# Patient Record
Sex: MALE | Race: WHITE | NOT HISPANIC OR LATINO | Employment: FULL TIME | URBAN - METROPOLITAN AREA
[De-identification: names, ages, dates, MRNs, and addresses within clinical notes are randomized per-mention and may not be internally consistent; named-entity substitution may affect disease eponyms.]

---

## 2017-03-07 ENCOUNTER — TELEPHONE (OUTPATIENT)
Dept: CARDIOLOGY | Facility: CLINIC | Age: 42
End: 2017-03-07

## 2017-03-07 DIAGNOSIS — B20 HIV (HUMAN IMMUNODEFICIENCY VIRUS INFECTION): Primary | ICD-10-CM

## 2017-03-07 DIAGNOSIS — R07.89 OTHER CHEST PAIN: ICD-10-CM

## 2017-03-07 NOTE — TELEPHONE ENCOUNTER
Received fax order to schedule patient for TM stress test.  Awaiting office note  Please have Dr. Burns review and schedule follow up appointment if needed

## 2017-03-16 ENCOUNTER — CLINICAL SUPPORT (OUTPATIENT)
Dept: CARDIOLOGY | Facility: CLINIC | Age: 42
End: 2017-03-16
Payer: COMMERCIAL

## 2017-03-16 DIAGNOSIS — B20 HIV (HUMAN IMMUNODEFICIENCY VIRUS INFECTION): ICD-10-CM

## 2017-03-16 DIAGNOSIS — R07.89 OTHER CHEST PAIN: ICD-10-CM

## 2017-03-16 LAB — DIASTOLIC DYSFUNCTION: NO

## 2017-03-16 PROCEDURE — 93000 ELECTROCARDIOGRAM COMPLETE: CPT | Mod: 59,S$GLB,, | Performed by: INTERNAL MEDICINE

## 2017-03-16 PROCEDURE — 93015 CV STRESS TEST SUPVJ I&R: CPT | Mod: S$GLB,,, | Performed by: INTERNAL MEDICINE

## 2017-03-20 ENCOUNTER — TELEPHONE (OUTPATIENT)
Dept: CARDIOLOGY | Facility: CLINIC | Age: 42
End: 2017-03-20

## 2021-04-28 ENCOUNTER — PATIENT MESSAGE (OUTPATIENT)
Dept: RESEARCH | Facility: HOSPITAL | Age: 46
End: 2021-04-28

## 2021-08-03 ENCOUNTER — OFFICE VISIT (OUTPATIENT)
Dept: SURGERY | Facility: CLINIC | Age: 46
End: 2021-08-03
Payer: COMMERCIAL

## 2021-08-03 ENCOUNTER — PATIENT MESSAGE (OUTPATIENT)
Dept: SURGERY | Facility: CLINIC | Age: 46
End: 2021-08-03

## 2021-08-03 VITALS
HEIGHT: 69 IN | HEART RATE: 97 BPM | SYSTOLIC BLOOD PRESSURE: 152 MMHG | BODY MASS INDEX: 34.61 KG/M2 | DIASTOLIC BLOOD PRESSURE: 89 MMHG | WEIGHT: 233.69 LBS

## 2021-08-03 DIAGNOSIS — K40.90 REDUCIBLE LEFT INGUINAL HERNIA: Primary | ICD-10-CM

## 2021-08-03 PROCEDURE — 3077F SYST BP >= 140 MM HG: CPT | Mod: CPTII,S$GLB,, | Performed by: SURGERY

## 2021-08-03 PROCEDURE — 1160F RVW MEDS BY RX/DR IN RCRD: CPT | Mod: CPTII,S$GLB,, | Performed by: SURGERY

## 2021-08-03 PROCEDURE — 3077F PR MOST RECENT SYSTOLIC BLOOD PRESSURE >= 140 MM HG: ICD-10-PCS | Mod: CPTII,S$GLB,, | Performed by: SURGERY

## 2021-08-03 PROCEDURE — 99204 OFFICE O/P NEW MOD 45 MIN: CPT | Mod: S$GLB,,, | Performed by: SURGERY

## 2021-08-03 PROCEDURE — 3008F BODY MASS INDEX DOCD: CPT | Mod: CPTII,S$GLB,, | Performed by: SURGERY

## 2021-08-03 PROCEDURE — 3079F DIAST BP 80-89 MM HG: CPT | Mod: CPTII,S$GLB,, | Performed by: SURGERY

## 2021-08-03 PROCEDURE — 99999 PR PBB SHADOW E&M-EST. PATIENT-LVL IV: ICD-10-PCS | Mod: PBBFAC,,, | Performed by: SURGERY

## 2021-08-03 PROCEDURE — 99999 PR PBB SHADOW E&M-EST. PATIENT-LVL IV: CPT | Mod: PBBFAC,,, | Performed by: SURGERY

## 2021-08-03 PROCEDURE — 1159F MED LIST DOCD IN RCRD: CPT | Mod: CPTII,S$GLB,, | Performed by: SURGERY

## 2021-08-03 PROCEDURE — 99204 PR OFFICE/OUTPT VISIT, NEW, LEVL IV, 45-59 MIN: ICD-10-PCS | Mod: S$GLB,,, | Performed by: SURGERY

## 2021-08-03 PROCEDURE — 3008F PR BODY MASS INDEX (BMI) DOCUMENTED: ICD-10-PCS | Mod: CPTII,S$GLB,, | Performed by: SURGERY

## 2021-08-03 PROCEDURE — 3079F PR MOST RECENT DIASTOLIC BLOOD PRESSURE 80-89 MM HG: ICD-10-PCS | Mod: CPTII,S$GLB,, | Performed by: SURGERY

## 2021-08-03 PROCEDURE — 1160F PR REVIEW ALL MEDS BY PRESCRIBER/CLIN PHARMACIST DOCUMENTED: ICD-10-PCS | Mod: CPTII,S$GLB,, | Performed by: SURGERY

## 2021-08-03 PROCEDURE — 1126F AMNT PAIN NOTED NONE PRSNT: CPT | Mod: CPTII,S$GLB,, | Performed by: SURGERY

## 2021-08-03 PROCEDURE — 1159F PR MEDICATION LIST DOCUMENTED IN MEDICAL RECORD: ICD-10-PCS | Mod: CPTII,S$GLB,, | Performed by: SURGERY

## 2021-08-03 PROCEDURE — 1126F PR PAIN SEVERITY QUANTIFIED, NO PAIN PRESENT: ICD-10-PCS | Mod: CPTII,S$GLB,, | Performed by: SURGERY

## 2021-08-03 RX ORDER — EFAVIRENZ, EMTRICITABINE AND TENOFOVIR DISOPROXIL FUMARATE 600; 200; 300 MG/1; MG/1; MG/1
1 TABLET, FILM COATED ORAL NIGHTLY
Status: ON HOLD | COMMUNITY
End: 2021-08-05

## 2021-08-03 RX ORDER — ATORVASTATIN CALCIUM 40 MG/1
TABLET, FILM COATED ORAL
COMMUNITY
Start: 2021-08-02

## 2021-08-03 RX ORDER — ALPRAZOLAM 0.25 MG/1
0.25 TABLET ORAL
COMMUNITY

## 2021-08-03 RX ORDER — SODIUM CHLORIDE 9 MG/ML
INJECTION, SOLUTION INTRAVENOUS CONTINUOUS
Status: CANCELLED | OUTPATIENT
Start: 2021-08-03

## 2021-08-04 ENCOUNTER — ANESTHESIA EVENT (OUTPATIENT)
Dept: SURGERY | Facility: HOSPITAL | Age: 46
End: 2021-08-04
Payer: COMMERCIAL

## 2021-08-04 ENCOUNTER — PATIENT MESSAGE (OUTPATIENT)
Dept: SURGERY | Facility: CLINIC | Age: 46
End: 2021-08-04

## 2021-08-04 RX ORDER — IBUPROFEN 800 MG/1
800 TABLET ORAL 2 TIMES DAILY PRN
COMMUNITY

## 2021-08-05 ENCOUNTER — ANESTHESIA (OUTPATIENT)
Dept: SURGERY | Facility: HOSPITAL | Age: 46
End: 2021-08-05
Payer: COMMERCIAL

## 2021-08-05 ENCOUNTER — HOSPITAL ENCOUNTER (OUTPATIENT)
Facility: HOSPITAL | Age: 46
Discharge: HOME | End: 2021-08-05
Attending: SURGERY | Admitting: SURGERY
Payer: COMMERCIAL

## 2021-08-05 VITALS
RESPIRATION RATE: 18 BRPM | SYSTOLIC BLOOD PRESSURE: 123 MMHG | TEMPERATURE: 98 F | DIASTOLIC BLOOD PRESSURE: 81 MMHG | OXYGEN SATURATION: 98 % | HEART RATE: 79 BPM

## 2021-08-05 DIAGNOSIS — K40.90 INGUINAL HERNIA: ICD-10-CM

## 2021-08-05 LAB
ANION GAP SERPL CALC-SCNC: 9 MMOL/L (ref 8–16)
BASOPHILS # BLD AUTO: 0.07 K/UL (ref 0–0.2)
BASOPHILS NFR BLD: 1.3 % (ref 0–1.9)
BUN SERPL-MCNC: 16 MG/DL (ref 6–20)
CALCIUM SERPL-MCNC: 9.1 MG/DL (ref 8.7–10.5)
CHLORIDE SERPL-SCNC: 111 MMOL/L (ref 95–110)
CO2 SERPL-SCNC: 19 MMOL/L (ref 23–29)
CREAT SERPL-MCNC: 1.5 MG/DL (ref 0.5–1.4)
DIFFERENTIAL METHOD: ABNORMAL
EOSINOPHIL # BLD AUTO: 0.2 K/UL (ref 0–0.5)
EOSINOPHIL NFR BLD: 3.7 % (ref 0–8)
ERYTHROCYTE [DISTWIDTH] IN BLOOD BY AUTOMATED COUNT: 13.3 % (ref 11.5–14.5)
EST. GFR  (AFRICAN AMERICAN): >60 ML/MIN/1.73 M^2
EST. GFR  (NON AFRICAN AMERICAN): 56 ML/MIN/1.73 M^2
GLUCOSE SERPL-MCNC: 108 MG/DL (ref 70–110)
HCT VFR BLD AUTO: 55.9 % (ref 40–54)
HGB BLD-MCNC: 19.5 G/DL (ref 14–18)
IMM GRANULOCYTES # BLD AUTO: 0.02 K/UL (ref 0–0.04)
IMM GRANULOCYTES NFR BLD AUTO: 0.4 % (ref 0–0.5)
LYMPHOCYTES # BLD AUTO: 1.7 K/UL (ref 1–4.8)
LYMPHOCYTES NFR BLD: 31.8 % (ref 18–48)
MCH RBC QN AUTO: 30.5 PG (ref 27–31)
MCHC RBC AUTO-ENTMCNC: 34.9 G/DL (ref 32–36)
MCV RBC AUTO: 88 FL (ref 82–98)
MONOCYTES # BLD AUTO: 0.6 K/UL (ref 0.3–1)
MONOCYTES NFR BLD: 11.5 % (ref 4–15)
NEUTROPHILS # BLD AUTO: 2.8 K/UL (ref 1.8–7.7)
NEUTROPHILS NFR BLD: 51.3 % (ref 38–73)
NRBC BLD-RTO: 0 /100 WBC
PLATELET # BLD AUTO: 252 K/UL (ref 150–450)
PMV BLD AUTO: 9.9 FL (ref 9.2–12.9)
POTASSIUM SERPL-SCNC: 3.9 MMOL/L (ref 3.5–5.1)
RBC # BLD AUTO: 6.39 M/UL (ref 4.6–6.2)
SARS-COV-2 RDRP RESP QL NAA+PROBE: NEGATIVE
SODIUM SERPL-SCNC: 139 MMOL/L (ref 136–145)
WBC # BLD AUTO: 5.41 K/UL (ref 3.9–12.7)

## 2021-08-05 PROCEDURE — 71000016 HC POSTOP RECOV ADDL HR: Performed by: SURGERY

## 2021-08-05 PROCEDURE — 85025 COMPLETE CBC W/AUTO DIFF WBC: CPT | Performed by: SURGERY

## 2021-08-05 PROCEDURE — C9290 INJ, BUPIVACAINE LIPOSOME: HCPCS | Performed by: SURGERY

## 2021-08-05 PROCEDURE — 25000003 PHARM REV CODE 250: Performed by: ANESTHESIOLOGY

## 2021-08-05 PROCEDURE — C1729 CATH, DRAINAGE: HCPCS | Performed by: SURGERY

## 2021-08-05 PROCEDURE — 37000009 HC ANESTHESIA EA ADD 15 MINS: Performed by: SURGERY

## 2021-08-05 PROCEDURE — 36000706: Performed by: SURGERY

## 2021-08-05 PROCEDURE — 36000707: Performed by: SURGERY

## 2021-08-05 PROCEDURE — D9220A PRA ANESTHESIA: Mod: ANES,,, | Performed by: ANESTHESIOLOGY

## 2021-08-05 PROCEDURE — 88304 PR  SURG PATH,LEVEL III: ICD-10-PCS | Mod: 26,,, | Performed by: PATHOLOGY

## 2021-08-05 PROCEDURE — 63600175 PHARM REV CODE 636 W HCPCS: Performed by: ANESTHESIOLOGY

## 2021-08-05 PROCEDURE — D9220A PRA ANESTHESIA: Mod: CRNA,,, | Performed by: REGISTERED NURSE

## 2021-08-05 PROCEDURE — 25000003 PHARM REV CODE 250: Performed by: SURGERY

## 2021-08-05 PROCEDURE — 71000033 HC RECOVERY, INTIAL HOUR: Performed by: SURGERY

## 2021-08-05 PROCEDURE — C1781 MESH (IMPLANTABLE): HCPCS | Performed by: SURGERY

## 2021-08-05 PROCEDURE — 80048 BASIC METABOLIC PNL TOTAL CA: CPT | Performed by: SURGERY

## 2021-08-05 PROCEDURE — 25000242 PHARM REV CODE 250 ALT 637 W/ HCPCS: Performed by: REGISTERED NURSE

## 2021-08-05 PROCEDURE — 49505 PR REPAIR ING HERNIA,5+Y/O,REDUCIBL: ICD-10-PCS | Mod: LT,,, | Performed by: SURGERY

## 2021-08-05 PROCEDURE — 63600175 PHARM REV CODE 636 W HCPCS: Performed by: SURGERY

## 2021-08-05 PROCEDURE — D9220A PRA ANESTHESIA: ICD-10-PCS | Mod: CRNA,,, | Performed by: REGISTERED NURSE

## 2021-08-05 PROCEDURE — 71000015 HC POSTOP RECOV 1ST HR: Performed by: SURGERY

## 2021-08-05 PROCEDURE — 25000003 PHARM REV CODE 250: Performed by: REGISTERED NURSE

## 2021-08-05 PROCEDURE — 37000008 HC ANESTHESIA 1ST 15 MINUTES: Performed by: SURGERY

## 2021-08-05 PROCEDURE — D9220A PRA ANESTHESIA: ICD-10-PCS | Mod: ANES,,, | Performed by: ANESTHESIOLOGY

## 2021-08-05 PROCEDURE — 63600175 PHARM REV CODE 636 W HCPCS: Performed by: REGISTERED NURSE

## 2021-08-05 PROCEDURE — 88304 TISSUE EXAM BY PATHOLOGIST: CPT | Mod: 26,,, | Performed by: PATHOLOGY

## 2021-08-05 PROCEDURE — 88304 TISSUE EXAM BY PATHOLOGIST: CPT | Performed by: PATHOLOGY

## 2021-08-05 PROCEDURE — U0002 COVID-19 LAB TEST NON-CDC: HCPCS | Performed by: SURGERY

## 2021-08-05 PROCEDURE — 49505 PRP I/HERN INIT REDUC >5 YR: CPT | Mod: LT,,, | Performed by: SURGERY

## 2021-08-05 PROCEDURE — 36415 COLL VENOUS BLD VENIPUNCTURE: CPT | Performed by: SURGERY

## 2021-08-05 DEVICE — MESH PROLENE 3INX6IN 6/BX: Type: IMPLANTABLE DEVICE | Site: GROIN | Status: FUNCTIONAL

## 2021-08-05 RX ORDER — SODIUM CHLORIDE 0.9 % (FLUSH) 0.9 %
10 SYRINGE (ML) INJECTION
Status: DISCONTINUED | OUTPATIENT
Start: 2021-08-05 | End: 2021-08-05 | Stop reason: HOSPADM

## 2021-08-05 RX ORDER — FENTANYL CITRATE 50 UG/ML
INJECTION, SOLUTION INTRAMUSCULAR; INTRAVENOUS
Status: DISCONTINUED | OUTPATIENT
Start: 2021-08-05 | End: 2021-08-05

## 2021-08-05 RX ORDER — OXYCODONE AND ACETAMINOPHEN 5; 325 MG/1; MG/1
1 TABLET ORAL ONCE
Status: COMPLETED | OUTPATIENT
Start: 2021-08-05 | End: 2021-08-05

## 2021-08-05 RX ORDER — ROCURONIUM BROMIDE 10 MG/ML
INJECTION, SOLUTION INTRAVENOUS
Status: DISCONTINUED | OUTPATIENT
Start: 2021-08-05 | End: 2021-08-05

## 2021-08-05 RX ORDER — BUPIVACAINE HYDROCHLORIDE 2.5 MG/ML
INJECTION, SOLUTION INFILTRATION; PERINEURAL
Status: DISCONTINUED | OUTPATIENT
Start: 2021-08-05 | End: 2021-08-05 | Stop reason: HOSPADM

## 2021-08-05 RX ORDER — LIDOCAINE HYDROCHLORIDE 10 MG/ML
1 INJECTION, SOLUTION EPIDURAL; INFILTRATION; INTRACAUDAL; PERINEURAL ONCE
Status: DISCONTINUED | OUTPATIENT
Start: 2021-08-05 | End: 2021-08-05 | Stop reason: HOSPADM

## 2021-08-05 RX ORDER — PROPOFOL 10 MG/ML
VIAL (ML) INTRAVENOUS
Status: DISCONTINUED | OUTPATIENT
Start: 2021-08-05 | End: 2021-08-05

## 2021-08-05 RX ORDER — HYDROMORPHONE HYDROCHLORIDE 2 MG/ML
0.2 INJECTION, SOLUTION INTRAMUSCULAR; INTRAVENOUS; SUBCUTANEOUS EVERY 5 MIN PRN
Status: DISCONTINUED | OUTPATIENT
Start: 2021-08-05 | End: 2021-08-05 | Stop reason: HOSPADM

## 2021-08-05 RX ORDER — NEOSTIGMINE METHYLSULFATE 1 MG/ML
INJECTION, SOLUTION INTRAVENOUS
Status: DISCONTINUED | OUTPATIENT
Start: 2021-08-05 | End: 2021-08-05

## 2021-08-05 RX ORDER — DEXAMETHASONE SODIUM PHOSPHATE 4 MG/ML
INJECTION, SOLUTION INTRA-ARTICULAR; INTRALESIONAL; INTRAMUSCULAR; INTRAVENOUS; SOFT TISSUE
Status: DISCONTINUED | OUTPATIENT
Start: 2021-08-05 | End: 2021-08-05

## 2021-08-05 RX ORDER — OXYCODONE AND ACETAMINOPHEN 5; 325 MG/1; MG/1
1 TABLET ORAL EVERY 4 HOURS PRN
Qty: 15 TABLET | Refills: 0 | Status: SHIPPED | OUTPATIENT
Start: 2021-08-05 | End: 2021-08-20

## 2021-08-05 RX ORDER — MIDAZOLAM HYDROCHLORIDE 1 MG/ML
INJECTION INTRAMUSCULAR; INTRAVENOUS
Status: DISCONTINUED | OUTPATIENT
Start: 2021-08-05 | End: 2021-08-05

## 2021-08-05 RX ORDER — SODIUM CHLORIDE 0.9 G/100ML
IRRIGANT IRRIGATION
Status: DISCONTINUED | OUTPATIENT
Start: 2021-08-05 | End: 2021-08-05 | Stop reason: HOSPADM

## 2021-08-05 RX ORDER — CLINDAMYCIN PHOSPHATE 900 MG/50ML
INJECTION, SOLUTION INTRAVENOUS
Status: DISCONTINUED | OUTPATIENT
Start: 2021-08-05 | End: 2021-08-05

## 2021-08-05 RX ORDER — KETOROLAC TROMETHAMINE 10 MG/1
10 TABLET, FILM COATED ORAL EVERY 8 HOURS
Qty: 9 TABLET | Refills: 0 | Status: SHIPPED | OUTPATIENT
Start: 2021-08-05 | End: 2021-08-19

## 2021-08-05 RX ORDER — ONDANSETRON 2 MG/ML
INJECTION INTRAMUSCULAR; INTRAVENOUS
Status: DISCONTINUED | OUTPATIENT
Start: 2021-08-05 | End: 2021-08-05

## 2021-08-05 RX ORDER — ALBUTEROL SULFATE 90 UG/1
AEROSOL, METERED RESPIRATORY (INHALATION)
Status: DISCONTINUED | OUTPATIENT
Start: 2021-08-05 | End: 2021-08-05

## 2021-08-05 RX ORDER — ACETAMINOPHEN 500 MG
1000 TABLET ORAL
Status: COMPLETED | OUTPATIENT
Start: 2021-08-05 | End: 2021-08-05

## 2021-08-05 RX ORDER — SUCCINYLCHOLINE CHLORIDE 20 MG/ML
INJECTION INTRAMUSCULAR; INTRAVENOUS
Status: DISCONTINUED | OUTPATIENT
Start: 2021-08-05 | End: 2021-08-05

## 2021-08-05 RX ORDER — SODIUM CHLORIDE, SODIUM LACTATE, POTASSIUM CHLORIDE, CALCIUM CHLORIDE 600; 310; 30; 20 MG/100ML; MG/100ML; MG/100ML; MG/100ML
INJECTION, SOLUTION INTRAVENOUS CONTINUOUS
Status: DISCONTINUED | OUTPATIENT
Start: 2021-08-05 | End: 2021-08-05 | Stop reason: HOSPADM

## 2021-08-05 RX ORDER — LIDOCAINE HYDROCHLORIDE 20 MG/ML
INJECTION INTRAVENOUS
Status: DISCONTINUED | OUTPATIENT
Start: 2021-08-05 | End: 2021-08-05

## 2021-08-05 RX ADMIN — FENTANYL CITRATE 50 MCG: 50 INJECTION, SOLUTION INTRAMUSCULAR; INTRAVENOUS at 07:08

## 2021-08-05 RX ADMIN — GLYCOPYRROLATE 0.6 MG: 0.2 INJECTION, SOLUTION INTRAMUSCULAR; INTRAVITREAL at 08:08

## 2021-08-05 RX ADMIN — ALBUTEROL SULFATE 2 PUFF: 90 AEROSOL, METERED RESPIRATORY (INHALATION) at 07:08

## 2021-08-05 RX ADMIN — SUCCINYLCHOLINE CHLORIDE 160 MG: 20 INJECTION, SOLUTION INTRAMUSCULAR; INTRAVENOUS at 07:08

## 2021-08-05 RX ADMIN — HYDROMORPHONE HYDROCHLORIDE 0.2 MG: 2 INJECTION INTRAMUSCULAR; INTRAVENOUS; SUBCUTANEOUS at 09:08

## 2021-08-05 RX ADMIN — FENTANYL CITRATE 50 MCG: 50 INJECTION, SOLUTION INTRAMUSCULAR; INTRAVENOUS at 09:08

## 2021-08-05 RX ADMIN — MIDAZOLAM HYDROCHLORIDE 2 MG: 1 INJECTION INTRAMUSCULAR; INTRAVENOUS at 07:08

## 2021-08-05 RX ADMIN — Medication 100 MG: at 07:08

## 2021-08-05 RX ADMIN — ACETAMINOPHEN 1000 MG: 500 TABLET, FILM COATED ORAL at 06:08

## 2021-08-05 RX ADMIN — NEOSTIGMINE METHYLSULFATE 5 MG: 1 INJECTION INTRAVENOUS at 08:08

## 2021-08-05 RX ADMIN — SODIUM CHLORIDE, SODIUM LACTATE, POTASSIUM CHLORIDE, AND CALCIUM CHLORIDE: .6; .31; .03; .02 INJECTION, SOLUTION INTRAVENOUS at 06:08

## 2021-08-05 RX ADMIN — DEXAMETHASONE SODIUM PHOSPHATE 4 MG: 4 INJECTION, SOLUTION INTRAMUSCULAR; INTRAVENOUS at 07:08

## 2021-08-05 RX ADMIN — ONDANSETRON 4 MG: 2 INJECTION, SOLUTION INTRAMUSCULAR; INTRAVENOUS at 08:08

## 2021-08-05 RX ADMIN — OXYCODONE AND ACETAMINOPHEN 1 TABLET: 5; 325 TABLET ORAL at 11:08

## 2021-08-05 RX ADMIN — BUPIVACAINE 133 MG: 13.3 INJECTION, SUSPENSION, LIPOSOMAL INFILTRATION at 07:08

## 2021-08-05 RX ADMIN — CLINDAMYCIN PHOSPHATE 900 MG: 18 INJECTION, SOLUTION INTRAVENOUS at 07:08

## 2021-08-05 RX ADMIN — FENTANYL CITRATE 100 MCG: 50 INJECTION, SOLUTION INTRAMUSCULAR; INTRAVENOUS at 07:08

## 2021-08-05 RX ADMIN — PROPOFOL 150 MG: 10 INJECTION, EMULSION INTRAVENOUS at 07:08

## 2021-08-05 RX ADMIN — PROPOFOL 50 MG: 10 INJECTION, EMULSION INTRAVENOUS at 07:08

## 2021-08-05 RX ADMIN — ROCURONIUM BROMIDE 20 MG: 10 INJECTION, SOLUTION INTRAVENOUS at 07:08

## 2021-08-06 DIAGNOSIS — K62.5 BRBPR (BRIGHT RED BLOOD PER RECTUM): Primary | ICD-10-CM

## 2021-08-09 ENCOUNTER — PATIENT MESSAGE (OUTPATIENT)
Dept: SURGERY | Facility: CLINIC | Age: 46
End: 2021-08-09

## 2021-08-09 LAB
FINAL PATHOLOGIC DIAGNOSIS: NORMAL
GROSS: NORMAL

## 2021-08-13 ENCOUNTER — OFFICE VISIT (OUTPATIENT)
Dept: SURGERY | Facility: CLINIC | Age: 46
End: 2021-08-13
Payer: COMMERCIAL

## 2021-08-13 VITALS
BODY MASS INDEX: 34.51 KG/M2 | SYSTOLIC BLOOD PRESSURE: 141 MMHG | WEIGHT: 233 LBS | HEIGHT: 69 IN | DIASTOLIC BLOOD PRESSURE: 95 MMHG | HEART RATE: 104 BPM

## 2021-08-13 DIAGNOSIS — K40.90 NON-RECURRENT UNILATERAL INGUINAL HERNIA WITHOUT OBSTRUCTION OR GANGRENE: Primary | ICD-10-CM

## 2021-08-13 PROCEDURE — 99999 PR PBB SHADOW E&M-EST. PATIENT-LVL III: ICD-10-PCS | Mod: PBBFAC,,, | Performed by: SURGERY

## 2021-08-13 PROCEDURE — 3008F BODY MASS INDEX DOCD: CPT | Mod: CPTII,S$GLB,, | Performed by: SURGERY

## 2021-08-13 PROCEDURE — 3077F PR MOST RECENT SYSTOLIC BLOOD PRESSURE >= 140 MM HG: ICD-10-PCS | Mod: CPTII,S$GLB,, | Performed by: SURGERY

## 2021-08-13 PROCEDURE — 99024 POSTOP FOLLOW-UP VISIT: CPT | Mod: S$GLB,,, | Performed by: SURGERY

## 2021-08-13 PROCEDURE — 1159F PR MEDICATION LIST DOCUMENTED IN MEDICAL RECORD: ICD-10-PCS | Mod: CPTII,S$GLB,, | Performed by: SURGERY

## 2021-08-13 PROCEDURE — 3077F SYST BP >= 140 MM HG: CPT | Mod: CPTII,S$GLB,, | Performed by: SURGERY

## 2021-08-13 PROCEDURE — 1159F MED LIST DOCD IN RCRD: CPT | Mod: CPTII,S$GLB,, | Performed by: SURGERY

## 2021-08-13 PROCEDURE — 1125F AMNT PAIN NOTED PAIN PRSNT: CPT | Mod: CPTII,S$GLB,, | Performed by: SURGERY

## 2021-08-13 PROCEDURE — 99999 PR PBB SHADOW E&M-EST. PATIENT-LVL III: CPT | Mod: PBBFAC,,, | Performed by: SURGERY

## 2021-08-13 PROCEDURE — 99024 PR POST-OP FOLLOW-UP VISIT: ICD-10-PCS | Mod: S$GLB,,, | Performed by: SURGERY

## 2021-08-13 PROCEDURE — 1125F PR PAIN SEVERITY QUANTIFIED, PAIN PRESENT: ICD-10-PCS | Mod: CPTII,S$GLB,, | Performed by: SURGERY

## 2021-08-13 PROCEDURE — 3080F DIAST BP >= 90 MM HG: CPT | Mod: CPTII,S$GLB,, | Performed by: SURGERY

## 2021-08-13 PROCEDURE — 3080F PR MOST RECENT DIASTOLIC BLOOD PRESSURE >= 90 MM HG: ICD-10-PCS | Mod: CPTII,S$GLB,, | Performed by: SURGERY

## 2021-08-13 PROCEDURE — 3008F PR BODY MASS INDEX (BMI) DOCUMENTED: ICD-10-PCS | Mod: CPTII,S$GLB,, | Performed by: SURGERY

## 2021-08-20 ENCOUNTER — OFFICE VISIT (OUTPATIENT)
Dept: SURGERY | Facility: CLINIC | Age: 46
End: 2021-08-20
Payer: COMMERCIAL

## 2021-08-20 VITALS
SYSTOLIC BLOOD PRESSURE: 160 MMHG | BODY MASS INDEX: 34.51 KG/M2 | DIASTOLIC BLOOD PRESSURE: 104 MMHG | HEART RATE: 76 BPM | WEIGHT: 233 LBS | HEIGHT: 69 IN

## 2021-08-20 DIAGNOSIS — K40.90 NON-RECURRENT UNILATERAL INGUINAL HERNIA WITHOUT OBSTRUCTION OR GANGRENE: Primary | ICD-10-CM

## 2021-08-20 PROCEDURE — 99999 PR PBB SHADOW E&M-EST. PATIENT-LVL III: ICD-10-PCS | Mod: PBBFAC,,, | Performed by: SURGERY

## 2021-08-20 PROCEDURE — 1159F MED LIST DOCD IN RCRD: CPT | Mod: CPTII,S$GLB,, | Performed by: SURGERY

## 2021-08-20 PROCEDURE — 3080F PR MOST RECENT DIASTOLIC BLOOD PRESSURE >= 90 MM HG: ICD-10-PCS | Mod: CPTII,S$GLB,, | Performed by: SURGERY

## 2021-08-20 PROCEDURE — 3080F DIAST BP >= 90 MM HG: CPT | Mod: CPTII,S$GLB,, | Performed by: SURGERY

## 2021-08-20 PROCEDURE — 1125F PR PAIN SEVERITY QUANTIFIED, PAIN PRESENT: ICD-10-PCS | Mod: CPTII,S$GLB,, | Performed by: SURGERY

## 2021-08-20 PROCEDURE — 1159F PR MEDICATION LIST DOCUMENTED IN MEDICAL RECORD: ICD-10-PCS | Mod: CPTII,S$GLB,, | Performed by: SURGERY

## 2021-08-20 PROCEDURE — 3077F PR MOST RECENT SYSTOLIC BLOOD PRESSURE >= 140 MM HG: ICD-10-PCS | Mod: CPTII,S$GLB,, | Performed by: SURGERY

## 2021-08-20 PROCEDURE — 99999 PR PBB SHADOW E&M-EST. PATIENT-LVL III: CPT | Mod: PBBFAC,,, | Performed by: SURGERY

## 2021-08-20 PROCEDURE — 1125F AMNT PAIN NOTED PAIN PRSNT: CPT | Mod: CPTII,S$GLB,, | Performed by: SURGERY

## 2021-08-20 PROCEDURE — 99024 POSTOP FOLLOW-UP VISIT: CPT | Mod: S$GLB,,, | Performed by: SURGERY

## 2021-08-20 PROCEDURE — 3008F BODY MASS INDEX DOCD: CPT | Mod: CPTII,S$GLB,, | Performed by: SURGERY

## 2021-08-20 PROCEDURE — 99024 PR POST-OP FOLLOW-UP VISIT: ICD-10-PCS | Mod: S$GLB,,, | Performed by: SURGERY

## 2021-08-20 PROCEDURE — 3077F SYST BP >= 140 MM HG: CPT | Mod: CPTII,S$GLB,, | Performed by: SURGERY

## 2021-08-20 PROCEDURE — 3008F PR BODY MASS INDEX (BMI) DOCUMENTED: ICD-10-PCS | Mod: CPTII,S$GLB,, | Performed by: SURGERY

## 2021-09-24 ENCOUNTER — OFFICE VISIT (OUTPATIENT)
Dept: SURGERY | Facility: CLINIC | Age: 46
End: 2021-09-24
Payer: COMMERCIAL

## 2021-09-24 ENCOUNTER — PATIENT MESSAGE (OUTPATIENT)
Dept: UROLOGY | Facility: CLINIC | Age: 46
End: 2021-09-24

## 2021-09-24 VITALS
WEIGHT: 233 LBS | BODY MASS INDEX: 34.51 KG/M2 | HEART RATE: 108 BPM | HEIGHT: 69 IN | SYSTOLIC BLOOD PRESSURE: 125 MMHG | DIASTOLIC BLOOD PRESSURE: 94 MMHG

## 2021-09-24 DIAGNOSIS — N50.812 TESTICULAR PAIN, LEFT: Primary | ICD-10-CM

## 2021-09-24 PROCEDURE — 99024 POSTOP FOLLOW-UP VISIT: CPT | Mod: S$GLB,,, | Performed by: SURGERY

## 2021-09-24 PROCEDURE — 99024 PR POST-OP FOLLOW-UP VISIT: ICD-10-PCS | Mod: S$GLB,,, | Performed by: SURGERY

## 2021-09-24 PROCEDURE — 1159F MED LIST DOCD IN RCRD: CPT | Mod: CPTII,S$GLB,, | Performed by: SURGERY

## 2021-09-24 PROCEDURE — 3074F PR MOST RECENT SYSTOLIC BLOOD PRESSURE < 130 MM HG: ICD-10-PCS | Mod: CPTII,S$GLB,, | Performed by: SURGERY

## 2021-09-24 PROCEDURE — 3008F BODY MASS INDEX DOCD: CPT | Mod: CPTII,S$GLB,, | Performed by: SURGERY

## 2021-09-24 PROCEDURE — 99999 PR PBB SHADOW E&M-EST. PATIENT-LVL III: ICD-10-PCS | Mod: PBBFAC,,, | Performed by: SURGERY

## 2021-09-24 PROCEDURE — 3080F DIAST BP >= 90 MM HG: CPT | Mod: CPTII,S$GLB,, | Performed by: SURGERY

## 2021-09-24 PROCEDURE — 3008F PR BODY MASS INDEX (BMI) DOCUMENTED: ICD-10-PCS | Mod: CPTII,S$GLB,, | Performed by: SURGERY

## 2021-09-24 PROCEDURE — 1159F PR MEDICATION LIST DOCUMENTED IN MEDICAL RECORD: ICD-10-PCS | Mod: CPTII,S$GLB,, | Performed by: SURGERY

## 2021-09-24 PROCEDURE — 3080F PR MOST RECENT DIASTOLIC BLOOD PRESSURE >= 90 MM HG: ICD-10-PCS | Mod: CPTII,S$GLB,, | Performed by: SURGERY

## 2021-09-24 PROCEDURE — 3074F SYST BP LT 130 MM HG: CPT | Mod: CPTII,S$GLB,, | Performed by: SURGERY

## 2021-09-24 PROCEDURE — 99999 PR PBB SHADOW E&M-EST. PATIENT-LVL III: CPT | Mod: PBBFAC,,, | Performed by: SURGERY

## 2021-11-04 ENCOUNTER — TELEPHONE (OUTPATIENT)
Dept: GASTROENTEROLOGY | Facility: CLINIC | Age: 46
End: 2021-11-04
Payer: COMMERCIAL

## 2021-11-22 ENCOUNTER — OFFICE VISIT (OUTPATIENT)
Dept: GASTROENTEROLOGY | Facility: CLINIC | Age: 46
End: 2021-11-22
Payer: COMMERCIAL

## 2021-11-22 VITALS
HEART RATE: 96 BPM | SYSTOLIC BLOOD PRESSURE: 140 MMHG | BODY MASS INDEX: 36.05 KG/M2 | DIASTOLIC BLOOD PRESSURE: 66 MMHG | HEIGHT: 69 IN | WEIGHT: 243.38 LBS

## 2021-11-22 DIAGNOSIS — K62.5 RECTAL BLEEDING: Primary | ICD-10-CM

## 2021-11-22 PROCEDURE — 99999 PR PBB SHADOW E&M-EST. PATIENT-LVL III: CPT | Mod: PBBFAC,,, | Performed by: INTERNAL MEDICINE

## 2021-11-22 PROCEDURE — 99204 PR OFFICE/OUTPT VISIT, NEW, LEVL IV, 45-59 MIN: ICD-10-PCS | Mod: S$GLB,,, | Performed by: INTERNAL MEDICINE

## 2021-11-22 PROCEDURE — 99999 PR PBB SHADOW E&M-EST. PATIENT-LVL III: ICD-10-PCS | Mod: PBBFAC,,, | Performed by: INTERNAL MEDICINE

## 2021-11-22 PROCEDURE — 99204 OFFICE O/P NEW MOD 45 MIN: CPT | Mod: S$GLB,,, | Performed by: INTERNAL MEDICINE

## 2021-12-03 ENCOUNTER — TELEPHONE (OUTPATIENT)
Dept: ENDOSCOPY | Facility: HOSPITAL | Age: 46
End: 2021-12-03
Payer: COMMERCIAL

## 2021-12-03 ENCOUNTER — PATIENT MESSAGE (OUTPATIENT)
Dept: ENDOSCOPY | Facility: HOSPITAL | Age: 46
End: 2021-12-03
Payer: COMMERCIAL

## 2021-12-03 DIAGNOSIS — Z12.11 COLON CANCER SCREENING: Primary | ICD-10-CM

## 2021-12-03 RX ORDER — POLYETHYLENE GLYCOL 3350, SODIUM SULFATE ANHYDROUS, SODIUM BICARBONATE, SODIUM CHLORIDE, POTASSIUM CHLORIDE 236; 22.74; 6.74; 5.86; 2.97 G/4L; G/4L; G/4L; G/4L; G/4L
4 POWDER, FOR SOLUTION ORAL ONCE
Qty: 4000 ML | Refills: 0 | Status: SHIPPED | OUTPATIENT
Start: 2021-12-03 | End: 2021-12-03

## 2021-12-11 ENCOUNTER — PATIENT MESSAGE (OUTPATIENT)
Dept: ENDOSCOPY | Facility: HOSPITAL | Age: 46
End: 2021-12-11
Payer: COMMERCIAL

## 2021-12-13 ENCOUNTER — PATIENT MESSAGE (OUTPATIENT)
Dept: ENDOSCOPY | Facility: HOSPITAL | Age: 46
End: 2021-12-13
Payer: COMMERCIAL

## 2021-12-14 ENCOUNTER — ANESTHESIA EVENT (OUTPATIENT)
Dept: ENDOSCOPY | Facility: HOSPITAL | Age: 46
End: 2021-12-14
Payer: COMMERCIAL

## 2021-12-15 ENCOUNTER — ANESTHESIA (OUTPATIENT)
Dept: ENDOSCOPY | Facility: HOSPITAL | Age: 46
End: 2021-12-15
Payer: COMMERCIAL

## 2021-12-15 ENCOUNTER — HOSPITAL ENCOUNTER (OUTPATIENT)
Facility: HOSPITAL | Age: 46
Discharge: HOME | End: 2021-12-15
Attending: INTERNAL MEDICINE | Admitting: INTERNAL MEDICINE
Payer: COMMERCIAL

## 2021-12-15 VITALS
OXYGEN SATURATION: 99 % | DIASTOLIC BLOOD PRESSURE: 80 MMHG | WEIGHT: 250 LBS | TEMPERATURE: 98 F | HEIGHT: 69 IN | HEART RATE: 89 BPM | RESPIRATION RATE: 14 BRPM | BODY MASS INDEX: 37.03 KG/M2 | SYSTOLIC BLOOD PRESSURE: 160 MMHG

## 2021-12-15 DIAGNOSIS — K62.5 RECTAL BLEEDING: Primary | ICD-10-CM

## 2021-12-15 PROCEDURE — 27201089 HC SNARE, DISP (ANY): Performed by: INTERNAL MEDICINE

## 2021-12-15 PROCEDURE — E9220 PRA ENDO ANESTHESIA: HCPCS | Mod: ,,, | Performed by: NURSE ANESTHETIST, CERTIFIED REGISTERED

## 2021-12-15 PROCEDURE — 88305 TISSUE EXAM BY PATHOLOGIST: CPT | Performed by: PATHOLOGY

## 2021-12-15 PROCEDURE — E9220 PRA ENDO ANESTHESIA: ICD-10-PCS | Mod: ,,, | Performed by: NURSE ANESTHETIST, CERTIFIED REGISTERED

## 2021-12-15 PROCEDURE — 25000003 PHARM REV CODE 250: Performed by: INTERNAL MEDICINE

## 2021-12-15 PROCEDURE — 63600175 PHARM REV CODE 636 W HCPCS: Performed by: NURSE ANESTHETIST, CERTIFIED REGISTERED

## 2021-12-15 PROCEDURE — 45385 PR COLONOSCOPY,REMV LESN,SNARE: ICD-10-PCS | Mod: ,,, | Performed by: INTERNAL MEDICINE

## 2021-12-15 PROCEDURE — 45385 COLONOSCOPY W/LESION REMOVAL: CPT | Mod: ,,, | Performed by: INTERNAL MEDICINE

## 2021-12-15 PROCEDURE — 88305 TISSUE EXAM BY PATHOLOGIST: CPT | Mod: 26,,, | Performed by: PATHOLOGY

## 2021-12-15 PROCEDURE — 25000003 PHARM REV CODE 250: Performed by: NURSE ANESTHETIST, CERTIFIED REGISTERED

## 2021-12-15 PROCEDURE — 37000009 HC ANESTHESIA EA ADD 15 MINS: Performed by: INTERNAL MEDICINE

## 2021-12-15 PROCEDURE — 45385 COLONOSCOPY W/LESION REMOVAL: CPT | Performed by: INTERNAL MEDICINE

## 2021-12-15 PROCEDURE — 88305 TISSUE EXAM BY PATHOLOGIST: ICD-10-PCS | Mod: 26,,, | Performed by: PATHOLOGY

## 2021-12-15 PROCEDURE — 37000008 HC ANESTHESIA 1ST 15 MINUTES: Performed by: INTERNAL MEDICINE

## 2021-12-15 RX ORDER — SODIUM CHLORIDE 9 MG/ML
INJECTION, SOLUTION INTRAVENOUS CONTINUOUS
Status: DISCONTINUED | OUTPATIENT
Start: 2021-12-15 | End: 2021-12-15 | Stop reason: HOSPADM

## 2021-12-15 RX ORDER — PROPOFOL 10 MG/ML
VIAL (ML) INTRAVENOUS
Status: DISCONTINUED | OUTPATIENT
Start: 2021-12-15 | End: 2021-12-15

## 2021-12-15 RX ORDER — PROPOFOL 10 MG/ML
VIAL (ML) INTRAVENOUS CONTINUOUS PRN
Status: DISCONTINUED | OUTPATIENT
Start: 2021-12-15 | End: 2021-12-15

## 2021-12-15 RX ORDER — LIDOCAINE HYDROCHLORIDE 20 MG/ML
INJECTION INTRAVENOUS
Status: DISCONTINUED | OUTPATIENT
Start: 2021-12-15 | End: 2021-12-15

## 2021-12-15 RX ADMIN — PROPOFOL 50 MG: 10 INJECTION, EMULSION INTRAVENOUS at 11:12

## 2021-12-15 RX ADMIN — Medication 150 MCG/KG/MIN: at 11:12

## 2021-12-15 RX ADMIN — SODIUM CHLORIDE: 0.9 INJECTION, SOLUTION INTRAVENOUS at 11:12

## 2021-12-15 RX ADMIN — PROPOFOL 100 MG: 10 INJECTION, EMULSION INTRAVENOUS at 11:12

## 2021-12-15 RX ADMIN — LIDOCAINE HYDROCHLORIDE 100 MG: 20 INJECTION, SOLUTION INTRAVENOUS at 11:12

## 2021-12-16 LAB — POCT GLUCOSE: 86 MG/DL (ref 70–110)

## 2021-12-21 LAB
FINAL PATHOLOGIC DIAGNOSIS: NORMAL
Lab: NORMAL

## 2021-12-22 ENCOUNTER — TELEPHONE (OUTPATIENT)
Dept: GASTROENTEROLOGY | Facility: CLINIC | Age: 46
End: 2021-12-22
Payer: COMMERCIAL

## 2021-12-22 ENCOUNTER — PATIENT MESSAGE (OUTPATIENT)
Dept: GASTROENTEROLOGY | Facility: CLINIC | Age: 46
End: 2021-12-22
Payer: COMMERCIAL

## 2022-01-07 ENCOUNTER — OFFICE VISIT (OUTPATIENT)
Dept: URGENT CARE | Facility: CLINIC | Age: 47
End: 2022-01-07
Payer: COMMERCIAL

## 2022-01-07 ENCOUNTER — HOSPITAL ENCOUNTER (OUTPATIENT)
Dept: RADIOLOGY | Facility: CLINIC | Age: 47
Discharge: HOME | End: 2022-01-07
Attending: PHYSICIAN ASSISTANT
Payer: COMMERCIAL

## 2022-01-07 VITALS
RESPIRATION RATE: 16 BRPM | TEMPERATURE: 99 F | DIASTOLIC BLOOD PRESSURE: 85 MMHG | OXYGEN SATURATION: 98 % | HEART RATE: 72 BPM | SYSTOLIC BLOOD PRESSURE: 154 MMHG

## 2022-01-07 DIAGNOSIS — M62.838 NECK MUSCLE SPASM: Primary | ICD-10-CM

## 2022-01-07 DIAGNOSIS — M54.12 CERVICAL RADICULAR PAIN: ICD-10-CM

## 2022-01-07 DIAGNOSIS — M62.838 NECK MUSCLE SPASM: ICD-10-CM

## 2022-01-07 DIAGNOSIS — M54.2 NECK PAIN ON LEFT SIDE: ICD-10-CM

## 2022-01-07 PROCEDURE — 1160F PR REVIEW ALL MEDS BY PRESCRIBER/CLIN PHARMACIST DOCUMENTED: ICD-10-PCS | Mod: CPTII,S$GLB,, | Performed by: PHYSICIAN ASSISTANT

## 2022-01-07 PROCEDURE — 3077F PR MOST RECENT SYSTOLIC BLOOD PRESSURE >= 140 MM HG: ICD-10-PCS | Mod: CPTII,S$GLB,, | Performed by: PHYSICIAN ASSISTANT

## 2022-01-07 PROCEDURE — 3079F DIAST BP 80-89 MM HG: CPT | Mod: CPTII,S$GLB,, | Performed by: PHYSICIAN ASSISTANT

## 2022-01-07 PROCEDURE — 3077F SYST BP >= 140 MM HG: CPT | Mod: CPTII,S$GLB,, | Performed by: PHYSICIAN ASSISTANT

## 2022-01-07 PROCEDURE — 72040 X-RAY EXAM NECK SPINE 2-3 VW: CPT | Mod: S$GLB,,, | Performed by: RADIOLOGY

## 2022-01-07 PROCEDURE — 1160F RVW MEDS BY RX/DR IN RCRD: CPT | Mod: CPTII,S$GLB,, | Performed by: PHYSICIAN ASSISTANT

## 2022-01-07 PROCEDURE — 99203 PR OFFICE/OUTPT VISIT, NEW, LEVL III, 30-44 MIN: ICD-10-PCS | Mod: 25,S$GLB,, | Performed by: PHYSICIAN ASSISTANT

## 2022-01-07 PROCEDURE — 1159F MED LIST DOCD IN RCRD: CPT | Mod: CPTII,S$GLB,, | Performed by: PHYSICIAN ASSISTANT

## 2022-01-07 PROCEDURE — 96372 THER/PROPH/DIAG INJ SC/IM: CPT | Mod: S$GLB,,, | Performed by: PHYSICIAN ASSISTANT

## 2022-01-07 PROCEDURE — 1159F PR MEDICATION LIST DOCUMENTED IN MEDICAL RECORD: ICD-10-PCS | Mod: CPTII,S$GLB,, | Performed by: PHYSICIAN ASSISTANT

## 2022-01-07 PROCEDURE — 3079F PR MOST RECENT DIASTOLIC BLOOD PRESSURE 80-89 MM HG: ICD-10-PCS | Mod: CPTII,S$GLB,, | Performed by: PHYSICIAN ASSISTANT

## 2022-01-07 PROCEDURE — 99203 OFFICE O/P NEW LOW 30 MIN: CPT | Mod: 25,S$GLB,, | Performed by: PHYSICIAN ASSISTANT

## 2022-01-07 PROCEDURE — 72040 XR CERVICAL SPINE AP LATERAL: ICD-10-PCS | Mod: S$GLB,,, | Performed by: RADIOLOGY

## 2022-01-07 PROCEDURE — 96372 PR INJECTION,THERAP/PROPH/DIAG2ST, IM OR SUBCUT: ICD-10-PCS | Mod: S$GLB,,, | Performed by: PHYSICIAN ASSISTANT

## 2022-01-07 RX ORDER — KETOROLAC TROMETHAMINE 30 MG/ML
30 INJECTION, SOLUTION INTRAMUSCULAR; INTRAVENOUS
Status: COMPLETED | OUTPATIENT
Start: 2022-01-07 | End: 2022-01-07

## 2022-01-07 RX ORDER — METHYLPREDNISOLONE 4 MG/1
TABLET ORAL
Qty: 1 EACH | Refills: 0 | Status: ON HOLD | OUTPATIENT
Start: 2022-01-07 | End: 2022-01-17 | Stop reason: HOSPADM

## 2022-01-07 RX ORDER — CYCLOBENZAPRINE HCL 10 MG
10 TABLET ORAL 3 TIMES DAILY PRN
Qty: 21 TABLET | Refills: 0 | Status: ON HOLD | OUTPATIENT
Start: 2022-01-07 | End: 2022-01-17 | Stop reason: HOSPADM

## 2022-01-07 RX ADMIN — KETOROLAC TROMETHAMINE 30 MG: 30 INJECTION, SOLUTION INTRAMUSCULAR; INTRAVENOUS at 09:01

## 2022-01-07 NOTE — PATIENT INSTRUCTIONS
"Patient Education       Neck Pain   The Basics   Written by the doctors and editors at [facility]   What can cause neck pain? -- Neck pain happens when there is a problem with or injury to any of the parts ("structures") of the neck (figure 1). The structures in the neck include:  · Bones - The neck has 7 bones that are stacked on top of each other. These bones make up the top part of the spine and are called the "cervical vertebrae." Neck pain can happen when the bones get worn down or develop abnormal growths (called "spurs").  · Ligaments - Ligaments are strong tissues that connect bones to other bones. Ligament damage can happen when the neck moves back and forth suddenly (called "whiplash"), such as in a car accident.  · Discs - Discs are cushions that sit between the bones. When the discs change shape or move out of position, people can have symptoms.  · Muscles - Muscles hold the head up and make the neck move. Neck pain can be caused by muscle strain or tension, such as from poor posture or stress.  · Nerves - A large bundle of nerves (called "the spinal cord") travels down the middle of the spine. Nerves branch off from the spinal cord to all parts of the body. People can have symptoms if their nerves are irritated or pushed on by nearby bones or discs.  What symptoms can people with neck pain have? -- People can have different symptoms that include:  · Pain, stiffness, or tightness in the neck, shoulders, upper back, or arms  · Headaches  · Neck weakness  · Being unable to move or turn the neck  · Pain when turning or tilting the head  · Numbness or strange feelings (such as pins and needles) in the shoulders or arms  · Trouble walking or moving the legs  · Having no control over the bladder or bowels  Should I see a doctor or nurse? -- You should see a doctor or nurse if you have:  · A severe injury to your head or neck  · Severe pain  · Numbness or weakness in your arms or legs  · No control over your " "bladder or bowels  · Pain that doesn't get better after you treat it at home for 1 week  Do I need to have tests? -- Most people do not need any tests. Your doctor or nurse will do an exam. They will feel your bones and muscles, check how your head and neck move, and might check the strength and reflexes in your arms.  But some people might need tests. Tests can include:  · X-ray, CT scan, MRI scan, or other imaging tests - Imaging tests create pictures of the inside of the body.  · Muscle or nerve tests to see if the muscles and nerves work normally  Is there anything I can do on my own to feel better? -- Yes. To reduce your symptoms, you can:  · Take a pain-relieving medicine - Examples include acetaminophen (sample brand name: Tylenol) or an NSAID such as ibuprofen (sample brand names: Advil, Motrin) or naproxen (sample brand names: Aleve, Naprosyn).  · Put ice on the area to reduce pain - You can put a cold gel pack, or a small bag of ice or frozen vegetables, on the area. Do this for 15 minutes at a time, a few times a day. Put a thin towel between the cold object and your skin to prevent skin damage.  · Put heat on the area to reduce pain and stiffness - Take a hot shower or hot bath, or put a hot towel or heating pad (on the "low" setting) on the area. Apply heat for 15 minutes at a time. Don't use anything too hot that could burn your skin.  · Do neck exercises - Different exercises can stretch the neck, shoulder, and back muscles and help make them stronger. These might involve turning or tilting your head gently, rolling your shoulders, and doing other stretches. Ask your doctor or nurse if you should do exercises and which ones can help your symptoms.   · Reduce stress - Stress can make pain worse and prevent symptoms from getting better. Try to reduce your stress. Some people find that it helps to try something called "mindfulness-based stress reduction." This involves going to a group program to " "practice relaxation and meditation.  · Improve your posture - Try to keep your neck straight in line with your body and avoid hunching forward. When you have to stay in one place, like while working at a desk, it might help to adjust your position often. When you sleep, use pillows to keep your head and neck in line with your body. Try to avoid sleeping on your stomach with your head turned to the side.  What other treatments might I have? -- Some people find that acupuncture or massage help relieve pain in the short term.  Your doctor or nurse can suggest other treatments if your neck pain doesn't improve after you treat it at home. For example, they might suggest that you see an exercise expert, called a physical therapist. Or your doctor might suggest other medicines, or an injection of a numbing medicine into your neck.  What treatments are not helpful? -- Most doctors do not recommend that people wear neck collars, especially for long periods of time. If you find that a neck collar eases your pain, wear a soft neck collar for less than 3 hours at a time. Wearing a neck collar for too long can make your neck muscles get too weak.  Other treatments that are not helpful include surgery or a treatment that pulls on the head to lengthen the neck (called "cervical traction").  Can neck pain be prevented? -- To help prevent neck pain, you can:  · Use good posture - Hold your head up and keep your shoulders down.  · Avoid sitting in the same position for too long  · Avoid doing work above your head for too long  · Avoid putting weight or pressure on your upper back  · Keep your neck in line with the rest of your body when you sleep  All topics are updated as new evidence becomes available and our peer review process is complete.  This topic retrieved from [employer] on: Sep 21, 2021.  Topic 40104 Version 19.0  Release: 29.4.2 - C29.263  © 2021 UpToDate, Inc. and/or its affiliates. All rights reserved.  figure 1: Anatomy " of the neck     Graphic 14370 Version 2.0    Consumer Information Use and Disclaimer   This information is not specific medical advice and does not replace information you receive from your health care provider. This is only a brief summary of general information. It does NOT include all information about conditions, illnesses, injuries, tests, procedures, treatments, therapies, discharge instructions or life-style choices that may apply to you. You must talk with your health care provider for complete information about your health and treatment options. This information should not be used to decide whether or not to accept your health care provider's advice, instructions or recommendations. Only your health care provider has the knowledge and training to provide advice that is right for you. The use of this information is governed by the [employer] End User License Agreement, available at https://www.[employer]/en/solutions/[employer]/about/rigo.The use of [employer] content is governed by the [employer] Terms of Use. ©2021 UpToDate, Inc. All rights reserved.  Copyright   © 2021 UpToDate, Inc. and/or its affiliates. All rights reserved.

## 2022-01-07 NOTE — PROGRESS NOTES
Subjective:       Patient ID: Diaz Serrano Jr. is a 46 y.o. male.    Vitals:  tympanic temperature is 98.6 °F (37 °C). His blood pressure is 154/85 (abnormal) and his pulse is 72. His respiration is 16 and oxygen saturation is 98%.     Chief Complaint: Neck Pain    Patient is 46-year-old male who presents to clinic with left-sided neck pain and stiffness that started 2 days ago.  Patient states that woke up with the pain and denies any trauma or injury.  No changes and physical activity.  Pain radiates down the left side of the neck and patient states that this morning he started to have some numbness and tingling along the lateral aspect of his left arm into his hand.  Denies any weakness of the left arm.  Patient states that extension and rotating neck to the left is extremely limited.  He has been alternating between ibuprofen and Tylenol as well as using ice and heat and massage with little relief. No hx of DDD or neck issues in the past.     Neck Pain   This is a new problem. The current episode started in the past 7 days (1/5/22). The problem occurs daily. The problem has been gradually worsening. The pain is associated with an unknown factor. The pain is present in the left side (Left shoulder/arm). The quality of the pain is described as aching. The pain is at a severity of 4/10. The pain is mild. The symptoms are aggravated by twisting and bending. Stiffness is present all day. Associated symptoms include numbness (left arm) and tingling (left arm). Pertinent negatives include no chest pain, fever, headaches, leg pain, pain with swallowing, paresis, photophobia, syncope, trouble swallowing, visual change or weakness. He has tried heat and ice (ibu 800) for the symptoms. The treatment provided no relief.       Constitution: Negative for fever.   HENT: Negative for trouble swallowing.    Neck: Positive for neck pain and neck stiffness. Negative for neck swelling and degenerative disc disease.    Cardiovascular: Negative for chest pain and passing out.   Eyes: Negative for photophobia.   Musculoskeletal: Negative for trauma.   Neurological: Positive for numbness (left arm) and tingling. Negative for headaches.       Objective:      Physical Exam   Constitutional: He appears well-developed.  Non-toxic appearance. He does not appear ill. No distress.   HENT:   Head: Normocephalic and atraumatic.   Ears:   Right Ear: External ear normal.   Left Ear: External ear normal.   Nose: Nose normal.   Eyes: Conjunctivae and EOM are normal.   Neck: No edema present. decreased range of motion (pain limited extension and right rotation) present.   Pulmonary/Chest: Effort normal.   Abdominal: Normal appearance.   Musculoskeletal:      Cervical back: He exhibits tenderness (left paraspinal region and left trapezius) and spasm (left trapezius). He exhibits no bony tenderness and no swelling.      Comments: Full strength in BUE.   Lymphadenopathy:     He has no cervical adenopathy.   Neurological: no focal deficit. He is alert. He displays no weakness. No sensory deficit. Gait normal.   Skin: Skin is warm, dry, not diaphoretic, not pale and no rash.   Psychiatric: His behavior is normal.     X-Ray Cervical Spine AP And Lateral    Result Date: 1/7/2022  EXAMINATION: XR CERVICAL SPINE AP LATERAL CLINICAL HISTORY: Other muscle spasm TECHNIQUE: AP, lateral and open mouth views of the cervical spine were performed. COMPARISON: None. FINDINGS: There is straightening of normal cervical lordosis potentially secondary to muscle spasm.  Minimal grade 1 anterolisthesis of C2 on C3 is noted.  Discogenic degenerative changes most pronounced at C5-C6 with moderate disc space narrowing and adjacent spurring.  No fracture.  Prevertebral soft tissues are within normal limits.  Intact odontoid.     As above Electronically signed by: Sylvain Liz MD Date:    01/07/2022 Time:    09:54        Assessment:       1. Neck muscle spasm    2.  Cervical radicular pain    3. Neck pain on left side          Plan:       Discussed concerns for degenerative disc changes at C3-4 and C5-6. Recommend to f/u with PCP to see if further imaging is necessary. Continue heat to the area prn. Recommend passive stretching and massage for muscle tightness/spasms. Avoid NSAIDs for 12 hrs after toradol injection. Discussed with pt that muscle relaxer may cause drowsiness and to use with caution; do not drive or operate machinery while taking this medication. F/u with PCP if symptoms worsen or fail to improve.     Neck muscle spasm  -     X-Ray Cervical Spine AP And Lateral; Future; Expected date: 01/07/2022  -     ketorolac injection 30 mg  -     cyclobenzaprine (FLEXERIL) 10 MG tablet; Take 1 tablet (10 mg total) by mouth 3 (three) times daily as needed for Muscle spasms.  Dispense: 21 tablet; Refill: 0  -     methylPREDNISolone (MEDROL DOSEPACK) 4 mg tablet; use as directed  Dispense: 1 each; Refill: 0    Cervical radicular pain  -     X-Ray Cervical Spine AP And Lateral; Future; Expected date: 01/07/2022  -     ketorolac injection 30 mg  -     cyclobenzaprine (FLEXERIL) 10 MG tablet; Take 1 tablet (10 mg total) by mouth 3 (three) times daily as needed for Muscle spasms.  Dispense: 21 tablet; Refill: 0  -     methylPREDNISolone (MEDROL DOSEPACK) 4 mg tablet; use as directed  Dispense: 1 each; Refill: 0    Neck pain on left side  -     ketorolac injection 30 mg  -     cyclobenzaprine (FLEXERIL) 10 MG tablet; Take 1 tablet (10 mg total) by mouth 3 (three) times daily as needed for Muscle spasms.  Dispense: 21 tablet; Refill: 0  -     methylPREDNISolone (MEDROL DOSEPACK) 4 mg tablet; use as directed  Dispense: 1 each; Refill: 0

## 2022-01-14 ENCOUNTER — ANESTHESIA EVENT (OUTPATIENT)
Dept: SURGERY | Facility: HOSPITAL | Age: 47
End: 2022-01-14
Payer: COMMERCIAL

## 2022-01-14 ENCOUNTER — HOSPITAL ENCOUNTER (OUTPATIENT)
Facility: HOSPITAL | Age: 47
Discharge: HOME | End: 2022-01-17
Attending: EMERGENCY MEDICINE | Admitting: EMERGENCY MEDICINE
Payer: COMMERCIAL

## 2022-01-14 ENCOUNTER — ANESTHESIA (OUTPATIENT)
Dept: SURGERY | Facility: HOSPITAL | Age: 47
End: 2022-01-14
Payer: COMMERCIAL

## 2022-01-14 DIAGNOSIS — R07.9 CHEST PAIN: ICD-10-CM

## 2022-01-14 DIAGNOSIS — M65.9 FLEXOR TENOSYNOVITIS OF FINGER: Primary | ICD-10-CM

## 2022-01-14 DIAGNOSIS — Z88.0 HISTORY OF PENICILLIN ALLERGY: ICD-10-CM

## 2022-01-14 PROBLEM — E78.5 HYPERLIPIDEMIA: Status: ACTIVE | Noted: 2022-01-14

## 2022-01-14 PROBLEM — I10 ESSENTIAL HYPERTENSION: Status: ACTIVE | Noted: 2022-01-14

## 2022-01-14 PROBLEM — B20 HIV (HUMAN IMMUNODEFICIENCY VIRUS INFECTION): Status: ACTIVE | Noted: 2022-01-14

## 2022-01-14 LAB
GRAM STN SPEC: NORMAL
POCT GLUCOSE: 90 MG/DL (ref 70–110)

## 2022-01-14 PROCEDURE — 71000033 HC RECOVERY, INTIAL HOUR: Performed by: ORTHOPAEDIC SURGERY

## 2022-01-14 PROCEDURE — 87075 CULTR BACTERIA EXCEPT BLOOD: CPT | Mod: 59 | Performed by: STUDENT IN AN ORGANIZED HEALTH CARE EDUCATION/TRAINING PROGRAM

## 2022-01-14 PROCEDURE — 99285 EMERGENCY DEPT VISIT HI MDM: CPT | Mod: 25

## 2022-01-14 PROCEDURE — 63600175 PHARM REV CODE 636 W HCPCS: Performed by: PHYSICIAN ASSISTANT

## 2022-01-14 PROCEDURE — 25000003 PHARM REV CODE 250: Performed by: STUDENT IN AN ORGANIZED HEALTH CARE EDUCATION/TRAINING PROGRAM

## 2022-01-14 PROCEDURE — 37000008 HC ANESTHESIA 1ST 15 MINUTES: Performed by: ORTHOPAEDIC SURGERY

## 2022-01-14 PROCEDURE — G0378 HOSPITAL OBSERVATION PER HR: HCPCS

## 2022-01-14 PROCEDURE — 96374 THER/PROPH/DIAG INJ IV PUSH: CPT | Mod: 59

## 2022-01-14 PROCEDURE — 36000705 HC OR TIME LEV I EA ADD 15 MIN: Performed by: ORTHOPAEDIC SURGERY

## 2022-01-14 PROCEDURE — 88305 TISSUE EXAM BY PATHOLOGIST: CPT | Mod: 26,,, | Performed by: STUDENT IN AN ORGANIZED HEALTH CARE EDUCATION/TRAINING PROGRAM

## 2022-01-14 PROCEDURE — 87077 CULTURE AEROBIC IDENTIFY: CPT | Performed by: STUDENT IN AN ORGANIZED HEALTH CARE EDUCATION/TRAINING PROGRAM

## 2022-01-14 PROCEDURE — 99204 OFFICE O/P NEW MOD 45 MIN: CPT | Mod: 57,,, | Performed by: ORTHOPAEDIC SURGERY

## 2022-01-14 PROCEDURE — 25000003 PHARM REV CODE 250: Performed by: NURSE ANESTHETIST, CERTIFIED REGISTERED

## 2022-01-14 PROCEDURE — 87206 SMEAR FLUORESCENT/ACID STAI: CPT | Performed by: STUDENT IN AN ORGANIZED HEALTH CARE EDUCATION/TRAINING PROGRAM

## 2022-01-14 PROCEDURE — 87186 SC STD MICRODIL/AGAR DIL: CPT | Performed by: STUDENT IN AN ORGANIZED HEALTH CARE EDUCATION/TRAINING PROGRAM

## 2022-01-14 PROCEDURE — 99285 PR EMERGENCY DEPT VISIT,LEVEL V: ICD-10-PCS | Mod: ,,, | Performed by: PHYSICIAN ASSISTANT

## 2022-01-14 PROCEDURE — 27201423 OPTIME MED/SURG SUP & DEVICES STERILE SUPPLY: Performed by: ORTHOPAEDIC SURGERY

## 2022-01-14 PROCEDURE — 99219 PR INITIAL OBSERVATION CARE,LEVL II: ICD-10-PCS | Mod: ,,, | Performed by: STUDENT IN AN ORGANIZED HEALTH CARE EDUCATION/TRAINING PROGRAM

## 2022-01-14 PROCEDURE — 82962 GLUCOSE BLOOD TEST: CPT | Performed by: ORTHOPAEDIC SURGERY

## 2022-01-14 PROCEDURE — 99285 EMERGENCY DEPT VISIT HI MDM: CPT | Mod: ,,, | Performed by: PHYSICIAN ASSISTANT

## 2022-01-14 PROCEDURE — 87205 SMEAR GRAM STAIN: CPT | Mod: 59 | Performed by: STUDENT IN AN ORGANIZED HEALTH CARE EDUCATION/TRAINING PROGRAM

## 2022-01-14 PROCEDURE — D9220A PRA ANESTHESIA: Mod: ANES,,, | Performed by: ANESTHESIOLOGY

## 2022-01-14 PROCEDURE — 26145 TENDON EXCISION PALM/FINGER: CPT | Mod: F6,,, | Performed by: ORTHOPAEDIC SURGERY

## 2022-01-14 PROCEDURE — 37000009 HC ANESTHESIA EA ADD 15 MINS: Performed by: ORTHOPAEDIC SURGERY

## 2022-01-14 PROCEDURE — 88305 TISSUE EXAM BY PATHOLOGIST: ICD-10-PCS | Mod: 26,,, | Performed by: STUDENT IN AN ORGANIZED HEALTH CARE EDUCATION/TRAINING PROGRAM

## 2022-01-14 PROCEDURE — 88305 TISSUE EXAM BY PATHOLOGIST: CPT | Performed by: STUDENT IN AN ORGANIZED HEALTH CARE EDUCATION/TRAINING PROGRAM

## 2022-01-14 PROCEDURE — 63600175 PHARM REV CODE 636 W HCPCS: Performed by: NURSE ANESTHETIST, CERTIFIED REGISTERED

## 2022-01-14 PROCEDURE — 26145 PR RAD EXCIS JT LINING,FLEXOR,EACH: ICD-10-PCS | Mod: F6,,, | Performed by: ORTHOPAEDIC SURGERY

## 2022-01-14 PROCEDURE — 87102 FUNGUS ISOLATION CULTURE: CPT | Mod: 59 | Performed by: STUDENT IN AN ORGANIZED HEALTH CARE EDUCATION/TRAINING PROGRAM

## 2022-01-14 PROCEDURE — 71000015 HC POSTOP RECOV 1ST HR: Performed by: ORTHOPAEDIC SURGERY

## 2022-01-14 PROCEDURE — 99204 PR OFFICE/OUTPT VISIT, NEW, LEVL IV, 45-59 MIN: ICD-10-PCS | Mod: 57,,, | Performed by: ORTHOPAEDIC SURGERY

## 2022-01-14 PROCEDURE — A9585 GADOBUTROL INJECTION: HCPCS | Performed by: EMERGENCY MEDICINE

## 2022-01-14 PROCEDURE — 25500020 PHARM REV CODE 255: Performed by: EMERGENCY MEDICINE

## 2022-01-14 PROCEDURE — 63600175 PHARM REV CODE 636 W HCPCS: Performed by: STUDENT IN AN ORGANIZED HEALTH CARE EDUCATION/TRAINING PROGRAM

## 2022-01-14 PROCEDURE — 99219 PR INITIAL OBSERVATION CARE,LEVL II: CPT | Mod: ,,, | Performed by: STUDENT IN AN ORGANIZED HEALTH CARE EDUCATION/TRAINING PROGRAM

## 2022-01-14 PROCEDURE — 87070 CULTURE OTHR SPECIMN AEROBIC: CPT | Mod: 59 | Performed by: STUDENT IN AN ORGANIZED HEALTH CARE EDUCATION/TRAINING PROGRAM

## 2022-01-14 PROCEDURE — 87116 MYCOBACTERIA CULTURE: CPT | Performed by: STUDENT IN AN ORGANIZED HEALTH CARE EDUCATION/TRAINING PROGRAM

## 2022-01-14 PROCEDURE — D9220A PRA ANESTHESIA: ICD-10-PCS | Mod: ANES,,, | Performed by: ANESTHESIOLOGY

## 2022-01-14 PROCEDURE — D9220A PRA ANESTHESIA: ICD-10-PCS | Mod: CRNA,,, | Performed by: NURSE ANESTHETIST, CERTIFIED REGISTERED

## 2022-01-14 PROCEDURE — D9220A PRA ANESTHESIA: Mod: CRNA,,, | Performed by: NURSE ANESTHETIST, CERTIFIED REGISTERED

## 2022-01-14 PROCEDURE — 36000704 HC OR TIME LEV I 1ST 15 MIN: Performed by: ORTHOPAEDIC SURGERY

## 2022-01-14 RX ORDER — FENTANYL CITRATE 50 UG/ML
INJECTION, SOLUTION INTRAMUSCULAR; INTRAVENOUS
Status: DISCONTINUED | OUTPATIENT
Start: 2022-01-14 | End: 2022-01-14

## 2022-01-14 RX ORDER — MIDAZOLAM HYDROCHLORIDE 1 MG/ML
INJECTION INTRAMUSCULAR; INTRAVENOUS
Status: DISCONTINUED | OUTPATIENT
Start: 2022-01-14 | End: 2022-01-14

## 2022-01-14 RX ORDER — HYDROMORPHONE HYDROCHLORIDE 1 MG/ML
0.2 INJECTION, SOLUTION INTRAMUSCULAR; INTRAVENOUS; SUBCUTANEOUS EVERY 5 MIN PRN
Status: DISCONTINUED | OUTPATIENT
Start: 2022-01-14 | End: 2022-01-14 | Stop reason: HOSPADM

## 2022-01-14 RX ORDER — TALC
6 POWDER (GRAM) TOPICAL NIGHTLY PRN
Status: DISCONTINUED | OUTPATIENT
Start: 2022-01-14 | End: 2022-01-17 | Stop reason: HOSPADM

## 2022-01-14 RX ORDER — SODIUM CHLORIDE 0.9 % (FLUSH) 0.9 %
10 SYRINGE (ML) INJECTION EVERY 12 HOURS PRN
Status: DISCONTINUED | OUTPATIENT
Start: 2022-01-14 | End: 2022-01-17 | Stop reason: HOSPADM

## 2022-01-14 RX ORDER — OXYCODONE HYDROCHLORIDE 10 MG/1
10 TABLET ORAL EVERY 6 HOURS PRN
Status: DISCONTINUED | OUTPATIENT
Start: 2022-01-14 | End: 2022-01-17 | Stop reason: HOSPADM

## 2022-01-14 RX ORDER — HYDROMORPHONE HYDROCHLORIDE 2 MG/ML
INJECTION, SOLUTION INTRAMUSCULAR; INTRAVENOUS; SUBCUTANEOUS
Status: DISCONTINUED | OUTPATIENT
Start: 2022-01-14 | End: 2022-01-14

## 2022-01-14 RX ORDER — GADOBUTROL 604.72 MG/ML
10 INJECTION INTRAVENOUS
Status: COMPLETED | OUTPATIENT
Start: 2022-01-14 | End: 2022-01-14

## 2022-01-14 RX ORDER — AZELASTINE HYDROCHLORIDE, FLUTICASONE PROPIONATE 137; 50 UG/1; UG/1
1 SPRAY, METERED NASAL
COMMUNITY
Start: 2021-12-27 | End: 2022-01-26

## 2022-01-14 RX ORDER — ALPRAZOLAM 0.25 MG/1
0.25 TABLET ORAL 2 TIMES DAILY PRN
Status: DISCONTINUED | OUTPATIENT
Start: 2022-01-14 | End: 2022-01-17 | Stop reason: HOSPADM

## 2022-01-14 RX ORDER — CEFUROXIME AXETIL 250 MG/1
250 TABLET ORAL 2 TIMES DAILY
Status: ON HOLD | COMMUNITY
Start: 2021-11-24 | End: 2022-01-17 | Stop reason: HOSPADM

## 2022-01-14 RX ORDER — LISINOPRIL AND HYDROCHLOROTHIAZIDE 10; 12.5 MG/1; MG/1
1 TABLET ORAL DAILY
Status: DISCONTINUED | OUTPATIENT
Start: 2022-01-15 | End: 2022-01-17 | Stop reason: HOSPADM

## 2022-01-14 RX ORDER — IBUPROFEN 200 MG
16 TABLET ORAL
Status: DISCONTINUED | OUTPATIENT
Start: 2022-01-14 | End: 2022-01-17 | Stop reason: HOSPADM

## 2022-01-14 RX ORDER — LIDOCAINE HCL/PF 100 MG/5ML
SYRINGE (ML) INTRAVENOUS
Status: DISCONTINUED | OUTPATIENT
Start: 2022-01-14 | End: 2022-01-14

## 2022-01-14 RX ORDER — CEFIXIME 400 MG/1
1 CAPSULE ORAL DAILY
Status: ON HOLD | COMMUNITY
Start: 2021-09-27 | End: 2022-01-17 | Stop reason: HOSPADM

## 2022-01-14 RX ORDER — ATORVASTATIN CALCIUM 10 MG/1
40 TABLET, FILM COATED ORAL DAILY
Status: DISCONTINUED | OUTPATIENT
Start: 2022-01-15 | End: 2022-01-17 | Stop reason: HOSPADM

## 2022-01-14 RX ORDER — GLUCAGON 1 MG
1 KIT INJECTION
Status: DISCONTINUED | OUTPATIENT
Start: 2022-01-14 | End: 2022-01-17 | Stop reason: HOSPADM

## 2022-01-14 RX ORDER — DEXAMETHASONE SODIUM PHOSPHATE 4 MG/ML
INJECTION, SOLUTION INTRA-ARTICULAR; INTRALESIONAL; INTRAMUSCULAR; INTRAVENOUS; SOFT TISSUE
Status: DISCONTINUED | OUTPATIENT
Start: 2022-01-14 | End: 2022-01-14

## 2022-01-14 RX ORDER — DULAGLUTIDE 0.75 MG/.5ML
0.75 INJECTION, SOLUTION SUBCUTANEOUS
COMMUNITY

## 2022-01-14 RX ORDER — ONDANSETRON 2 MG/ML
INJECTION INTRAMUSCULAR; INTRAVENOUS
Status: DISCONTINUED | OUTPATIENT
Start: 2022-01-14 | End: 2022-01-14

## 2022-01-14 RX ORDER — EMTRICITABINE, RILPIVIRINE HYDROCHLORIDE, AND TENOFOVIR ALAFENAMIDE 200; 25; 25 MG/1; MG/1; MG/1
TABLET ORAL
COMMUNITY

## 2022-01-14 RX ORDER — SODIUM CHLORIDE 0.9 % (FLUSH) 0.9 %
3 SYRINGE (ML) INJECTION
Status: DISCONTINUED | OUTPATIENT
Start: 2022-01-14 | End: 2022-01-14 | Stop reason: HOSPADM

## 2022-01-14 RX ORDER — KETAMINE HCL IN 0.9 % NACL 50 MG/5 ML
SYRINGE (ML) INTRAVENOUS
Status: DISCONTINUED | OUTPATIENT
Start: 2022-01-14 | End: 2022-01-14

## 2022-01-14 RX ORDER — HALOPERIDOL 5 MG/ML
0.5 INJECTION INTRAMUSCULAR EVERY 10 MIN PRN
Status: DISCONTINUED | OUTPATIENT
Start: 2022-01-14 | End: 2022-01-14 | Stop reason: HOSPADM

## 2022-01-14 RX ORDER — OXYCODONE HYDROCHLORIDE 5 MG/1
5 TABLET ORAL EVERY 6 HOURS PRN
Status: DISCONTINUED | OUTPATIENT
Start: 2022-01-14 | End: 2022-01-17 | Stop reason: HOSPADM

## 2022-01-14 RX ORDER — INSULIN ASPART 100 [IU]/ML
0-5 INJECTION, SOLUTION INTRAVENOUS; SUBCUTANEOUS
Status: DISCONTINUED | OUTPATIENT
Start: 2022-01-14 | End: 2022-01-17 | Stop reason: HOSPADM

## 2022-01-14 RX ORDER — IBUPROFEN 200 MG
24 TABLET ORAL
Status: DISCONTINUED | OUTPATIENT
Start: 2022-01-14 | End: 2022-01-17 | Stop reason: HOSPADM

## 2022-01-14 RX ORDER — MORPHINE SULFATE 4 MG/ML
4 INJECTION, SOLUTION INTRAMUSCULAR; INTRAVENOUS
Status: COMPLETED | OUTPATIENT
Start: 2022-01-14 | End: 2022-01-14

## 2022-01-14 RX ORDER — ACETAMINOPHEN 10 MG/ML
INJECTION, SOLUTION INTRAVENOUS
Status: DISCONTINUED | OUTPATIENT
Start: 2022-01-14 | End: 2022-01-14

## 2022-01-14 RX ORDER — NALOXONE HCL 0.4 MG/ML
0.02 VIAL (ML) INJECTION
Status: DISCONTINUED | OUTPATIENT
Start: 2022-01-14 | End: 2022-01-17 | Stop reason: HOSPADM

## 2022-01-14 RX ORDER — SODIUM CHLORIDE 0.9 % (FLUSH) 0.9 %
10 SYRINGE (ML) INJECTION
Status: DISCONTINUED | OUTPATIENT
Start: 2022-01-14 | End: 2022-01-17 | Stop reason: HOSPADM

## 2022-01-14 RX ADMIN — Medication 30 MG: at 05:01

## 2022-01-14 RX ADMIN — HYDROMORPHONE HYDROCHLORIDE 2 MG: 2 INJECTION INTRAMUSCULAR; INTRAVENOUS; SUBCUTANEOUS at 05:01

## 2022-01-14 RX ADMIN — LIDOCAINE HYDROCHLORIDE 200 MG: 20 INJECTION, SOLUTION INTRAVENOUS at 05:01

## 2022-01-14 RX ADMIN — OXYCODONE HYDROCHLORIDE 10 MG: 10 TABLET ORAL at 09:01

## 2022-01-14 RX ADMIN — DEXAMETHASONE SODIUM PHOSPHATE 8 MG: 4 INJECTION, SOLUTION INTRAMUSCULAR; INTRAVENOUS at 05:01

## 2022-01-14 RX ADMIN — GADOBUTROL 10 ML: 604.72 INJECTION INTRAVENOUS at 11:01

## 2022-01-14 RX ADMIN — MIDAZOLAM HYDROCHLORIDE 2 MG: 1 INJECTION, SOLUTION INTRAMUSCULAR; INTRAVENOUS at 05:01

## 2022-01-14 RX ADMIN — VANCOMYCIN HYDROCHLORIDE 1750 MG: 500 INJECTION, POWDER, LYOPHILIZED, FOR SOLUTION INTRAVENOUS at 11:01

## 2022-01-14 RX ADMIN — MORPHINE SULFATE 4 MG: 4 INJECTION INTRAVENOUS at 02:01

## 2022-01-14 RX ADMIN — ONDANSETRON 4 MG: 2 INJECTION INTRAMUSCULAR; INTRAVENOUS at 05:01

## 2022-01-14 RX ADMIN — FENTANYL CITRATE 100 MCG: 50 INJECTION, SOLUTION INTRAMUSCULAR; INTRAVENOUS at 05:01

## 2022-01-14 RX ADMIN — ACETAMINOPHEN 1000 MG: 10 INJECTION, SOLUTION INTRAVENOUS at 05:01

## 2022-01-14 NOTE — H&P
[facility] ([facility])  [facility] Medicine  History & Physical    Patient Name: Diaz Serrano Jr.  MRN: [id number]  Patient Class: OP- Observation  Admission Date: 1/14/2022  Attending Physician: Linden Hinojosa*   Primary Care Provider: Michelle Del Valle MD         Patient information was obtained from patient, past medical records and ER records.     Subjective:     Principal Problem:Flexor tenosynovitis of finger    Chief Complaint:   Chief Complaint   Patient presents with    Hand Injury     Tx from [facility] for hand surgery, on Wednesday his right hand started swelling. Pt A&O x4, ambulatory, denies other problems.         HPI: Diaz Serrano Jr. Is a 46 y.o. male with past medical history of HIV, HTN, HLD who presented as transfer from [facility] for Orthopedic hand surgery services. Patient states symptoms started about three days ago, initially with small part of skin breakdown on the distal part of his index finger. Over the next several days he had progressive swelling and redness of the area along with concurrent pain. He was given bactrim but only was able to take one dose before he presented. He was then transferred to [facility] for concern for flexor tenosynovitis. He was admitted to hospital medicine with orthopedic surgery consult.       Past Medical History:   Diagnosis Date    HIV (human immunodeficiency virus infection)     Hypertension     Pre-diabetes        Past Surgical History:   Procedure Laterality Date    ABDOMINAL SURGERY      ADENOIDECTOMY      COLONOSCOPY N/A 12/15/2021    Procedure: COLONOSCOPY;  Surgeon: Fredrick Cuadra MD;  Location: [facility] ([address] [place] [address]);  Service: Endoscopy;  Laterality: N/A;  fully vaccinated-GT  12/13 pt rescheduled; updated instructions to [facility]    TONSILLECTOMY         Review of patient's allergies indicates:   Allergen Reactions    Pcn [penicillins]        Current Facility-Administered Medications on File Prior to  Encounter   Medication    [DISCONTINUED] levoFLOXacin 750 mg/150 mL IVPB 750 mg    [DISCONTINUED] morphine injection 6 mg    [DISCONTINUED] ondansetron disintegrating tablet 4 mg     Current Outpatient Medications on File Prior to Encounter   Medication Sig    azelastine-fluticasone (DYMISTA) 137-50 mcg/spray Spry nassal spray 1 spray by Nasal route.    ALPRAZolam (XANAX) 0.25 MG tablet Take 0.25 mg by mouth.    atorvastatin (LIPITOR) 40 MG tablet     cefixime (SUPRAX) 400 mg Cap Take 1 capsule by mouth once daily.    cefUROXime (CEFTIN) 250 MG tablet Take 250 mg by mouth 2 (two) times daily.    cyclobenzaprine (FLEXERIL) 10 MG tablet Take 1 tablet (10 mg total) by mouth 3 (three) times daily as needed for Muscle spasms.    dulaglutide (TRULICITY) 0.75 mg/0.5 mL pen injector Inject into the skin every 7 days.    dulaglutide (TRULICITY) 0.75 mg/0.5 mL pen injector Inject 0.75 mg into the skin.    xdyagaylff-awqapyfi-xdhamo ala (ODEFSEY) 200-25-25 mg Tab Take by mouth.    caokyemblv-mhqjwedt-vvofij ala (ODEFSEY) 200-25-25 mg Tab Take by mouth.    ibuprofen (ADVIL,MOTRIN) 800 MG tablet Take 800 mg by mouth 2 (two) times daily as needed for Pain.    lisinopriL-hydrochlorothiazide (PRINZIDE,ZESTORETIC) 10-12.5 mg per tablet Take 1 tablet by mouth once daily.    methylPREDNISolone (MEDROL DOSEPACK) 4 mg tablet use as directed     Family History     Problem Relation (Age of Onset)    Colon cancer Maternal Grandmother        Tobacco Use    Smoking status: Never Smoker    Smokeless tobacco: Never Used   Substance and Sexual Activity    Alcohol use: Not on file    Drug use: Not on file    Sexual activity: Not on file     Review of Systems   Constitutional: Negative for chills and fever.   HENT: Negative for congestion and sore throat.    Respiratory: Negative for cough and shortness of breath.    Cardiovascular: Negative for chest pain and leg swelling.   Gastrointestinal: Negative for abdominal pain,  nausea and vomiting.   Genitourinary: Negative for decreased urine volume, dysuria, frequency and urgency.   Musculoskeletal: Positive for arthralgias and joint swelling. Negative for myalgias.   Skin: Negative for color change and pallor.   Neurological: Negative for dizziness, light-headedness and headaches.   Psychiatric/Behavioral: Negative for agitation and confusion.     Objective:     Vital Signs (Most Recent):  Temp: 98 °F (36.7 °C) (01/14/22 1723)  Pulse: 85 (01/14/22 1723)  Resp: 16 (01/14/22 1723)  BP: (!) 149/78 (01/14/22 1723)  SpO2: 98 % (01/14/22 1723) Vital Signs (24h Range):  Temp:  [98 °F (36.7 °C)-99 °F (37.2 °C)] 98 °F (36.7 °C)  Pulse:  [82-97] 85  Resp:  [12-19] 16  SpO2:  [96 %-98 %] 98 %  BP: (124-153)/(72-84) 149/78     Weight: 113.4 kg (250 lb)  Body mass index is 36.92 kg/m².    Physical Exam  Constitutional:       General: He is not in acute distress.     Appearance: Normal appearance. He is not toxic-appearing or diaphoretic.   Cardiovascular:      Rate and Rhythm: Normal rate and regular rhythm.      Heart sounds: No murmur heard.  No friction rub. No gallop.    Pulmonary:      Effort: Pulmonary effort is normal. No respiratory distress.      Breath sounds: No wheezing or rales.   Abdominal:      General: Abdomen is flat. There is no distension.      Palpations: Abdomen is soft.      Tenderness: There is no abdominal tenderness. There is no guarding or rebound.   Musculoskeletal:      Cervical back: Normal range of motion and neck supple.      Right lower leg: No edema.      Left lower leg: No edema.      Comments: Hand in cast   Skin:     General: Skin is warm and dry.   Neurological:      Mental Status: He is alert.             Significant Labs: All pertinent labs within the past 24 hours have been reviewed.    Significant Imaging: I have reviewed all pertinent imaging results/findings within the past 24 hours.    Assessment/Plan:     * Flexor tenosynovitis of finger  MRI with flexor  tenosynovitis of the index finer extending to the level of the metacarpal neck  Ortho consulted, plan for OR 1/14/22. Hold off on abx as patient is not septic to increase yield of intraoperative cultures. ID consult following surgical intervention. NPO for now.       Hyperlipidemia  Continue lipitor      Essential hypertension  Continue hctz - lisinopril      HIV (human immunodeficiency virus infection)  Continue home odefsey         VTE Risk Mitigation (From admission, onward)         Ordered     IP VTE HIGH RISK PATIENT  Once         01/14/22 1720     Place sequential compression device  Until discontinued         01/14/22 1720                   Linden Andrea MD  Department of Hospital Medicine   [facility] - Surgery ([facility])

## 2022-01-14 NOTE — SUBJECTIVE & OBJECTIVE
Past Medical History:   Diagnosis Date    HIV (human immunodeficiency virus infection)     Hypertension     Pre-diabetes        Past Surgical History:   Procedure Laterality Date    ABDOMINAL SURGERY      ADENOIDECTOMY      COLONOSCOPY N/A 12/15/2021    Procedure: COLONOSCOPY;  Surgeon: Fredrick Cuadra MD;  Location: [address];  Service: Endoscopy;  Laterality: N/A;  fully vaccinated-GT  12/13 pt rescheduled; updated instructions to [place]-[facility]    TONSILLECTOMY         Review of patient's allergies indicates:   Allergen Reactions    Pcn [penicillins]        No current facility-administered medications for this encounter.     Current Outpatient Medications   Medication Sig    azelastine-fluticasone (DYMISTA) 137-50 mcg/spray Spry nassal spray 1 spray by Nasal route.    ALPRAZolam (XANAX) 0.25 MG tablet Take 0.25 mg by mouth.    atorvastatin (LIPITOR) 40 MG tablet     cefixime (SUPRAX) 400 mg Cap Take 1 capsule by mouth once daily.    cefUROXime (CEFTIN) 250 MG tablet Take 250 mg by mouth 2 (two) times daily.    cyclobenzaprine (FLEXERIL) 10 MG tablet Take 1 tablet (10 mg total) by mouth 3 (three) times daily as needed for Muscle spasms.    dulaglutide (TRULICITY) 0.75 mg/0.5 mL pen injector Inject into the skin every 7 days.    dulaglutide (TRULICITY) 0.75 mg/0.5 mL pen injector Inject 0.75 mg into the skin.    hblzwfjndz-jgkjzxps-ncqgkk ala (ODEFSEY) 200-25-25 mg Tab Take by mouth.    lekdkhjktu-aohazanf-undlpn ala (ODEFSEY) 200-25-25 mg Tab Take by mouth.    ibuprofen (ADVIL,MOTRIN) 800 MG tablet Take 800 mg by mouth 2 (two) times daily as needed for Pain.    lisinopriL-hydrochlorothiazide (PRINZIDE,ZESTORETIC) 10-12.5 mg per tablet Take 1 tablet by mouth once daily.    methylPREDNISolone (MEDROL DOSEPACK) 4 mg tablet use as directed     Family History     Problem Relation (Age of Onset)    Colon cancer Maternal Grandmother        Tobacco Use    Smoking status: Never Smoker     Smokeless tobacco: Never Used   Substance and Sexual Activity    Alcohol use: Not on file    Drug use: Not on file    Sexual activity: Not on file     ROS   Constitutional: negative for fevers  Eyes: negative visual changes  ENT: negative for hearing loss  Respiratory: negative for dyspnea  Cardiovascular: negative for chest pain  Gastrointestinal: negative for abdominal pain  Genitourinary: negative for dysuria  Neurological: negative for headaches  Behavioral/Psych: negative for hallucinations  Endocrine: negative for temperature intolerance    Objective:     Vital Signs (Most Recent):  Temp: 99 °F (37.2 °C) (01/14/22 0945)  Pulse: 89 (01/14/22 0945)  Resp: 19 (01/14/22 0945)  BP: 124/84 (01/14/22 0945)  SpO2: 97 % (01/14/22 0945) Vital Signs (24h Range):  Temp:  [98.4 °F (36.9 °C)-99 °F (37.2 °C)] 99 °F (37.2 °C)  Pulse:  [82-97] 89  Resp:  [12-20] 19  SpO2:  [96 %-98 %] 97 %  BP: (124-153)/(72-84) 124/84           There is no height or weight on file to calculate BMI.    No intake or output data in the 24 hours ending 01/14/22 1231    Ortho/SPM Exam   Gen:  No acute distress  CV:  Peripherally well-perfused.    Lungs:  Normal respiratory effort.  Head/Neck:  Normocephalic.  Atraumatic.    MSK:  RUE:  - Small crack over DIP flexion crease R IF, cellulitic dorsal IF with areas of prior drainage over dorsal aspect of P1  - Fusiform swelling of R IF  - R IF held in slight flexion  - Exquisitely TTP over R IF flexor sheath  - Pain with passive extension of the R IF DIP  - AROM and PROM of the shoulder, elbow, wrist intact without pain  - Axillary/AIN/PIN/Radial/Median/Ulnar Nerves assessed in isolation without deficit  - SILT throughout  - Compartments soft  - Radial artery palpated   - Capillary Refill <3s    LUE:  - Skin intact throughout, no open wounds  - No swelling  - No ecchymosis, erythema, or signs of cellulitis  - NonTTP throughout  - AROM and PROM of the shoulder, elbow, wrist, and hand intact without  pain  - Axillary/AIN/PIN/Radial/Median/Ulnar Nerves assessed in isolation without deficit  - SILT throughout  - Compartments soft  - Radial artery palpated   - Capillary Refill <3s    BLE:  - Skin intact throughout, no open wounds  - No swelling  - No ecchymosis, erythema, or signs of cellulitis  - NonTTP throughout  - AROM and PROM of the hip, knee, ankle, and foot intact without pain  - TA/EHL/Gastroc/FHL assessed in isolation without deficit  - SILT throughout  - Compartments soft  - DP and PT palpated  - Capillary Refill <3s      Spine/pelvis/axial body:  No tenderness to palpation of cervical, thoracic, or lumbar spine  No pain with compression of pelvis  No chest wall or abdominal tenderness  No decubitus ulcers    Significant Labs:   Recent Lab Results       01/13/22  1803         Acceptable Yes       SARS-CoV-2 RNA, Amplification, Qual Negative           All pertinent labs within the past 24 hours have been reviewed.    Significant Imaging: I have reviewed all pertinent imaging results/findings.     flexor tenosynovitis of the index finger extending to the level of the metacarpal neck

## 2022-01-14 NOTE — PROGRESS NOTES
Patient arrived to preop and OR team was ready to transfer patient, safety check complete, all consents present, site [place] visible, vs as charted, pt denies pain, family set up for text messaging, pt transferred to surgery in nad.

## 2022-01-14 NOTE — ANESTHESIA PREPROCEDURE EVALUATION
01/14/2022  Diaz Serrano Jr. is a 46 y.o., male.    Anesthesia Evaluation    I have reviewed the Patient Summary Reports.    I have reviewed the Nursing Notes. I have reviewed the NPO Status.   I have reviewed the Medications.     Review of Systems  Anesthesia Hx:  No problems with previous Anesthesia  History of prior surgery of interest to airway management or planning: Denies Family Hx of Anesthesia complications.   Denies Personal Hx of Anesthesia complications.   Hematology/Oncology:  Hematology Normal   Oncology Normal     EENT/Dental:EENT/Dental Normal   Cardiovascular:   Exercise tolerance: good Hypertension ECG has been reviewed.    Pulmonary:   Sleep Apnea, CPAP    Education provided regarding risk of obstructive sleep apnea     Renal/:  Renal/ Normal     Hepatic/GI:  Hepatic/GI Normal    Musculoskeletal:  Musculoskeletal Normal    Neurological:  Neurology Normal    Endocrine:   Diabetes, type 2    Dermatological:  Skin Normal    Psych:  Psychiatric Normal           Physical Exam  General:  Obesity, Large Beard    Airway/Jaw/Neck:  Airway Findings: Mouth Opening: Normal Tongue: Normal  General Airway Assessment: Adult  Mallampati: III  TM Distance: Normal, at least 6 cm        Eyes/Ears/Nose:  EYES/EARS/NOSE FINDINGS: Normal   Dental:  DENTAL FINDINGS: Normal   Chest/Lungs:  Chest/Lungs Clear    Heart/Vascular:  Heart Findings: Normal Heart murmur: negative Vascular Findings: Normal    Abdomen:  Abdomen Findings: Normal    Musculoskeletal:  Musculoskeletal Findings: Normal   Skin:  Skin Findings: Normal    Mental Status:  Mental Status Findings: Normal        Anesthesia Plan  Type of Anesthesia, risks & benefits discussed:  Anesthesia Type:  general    Patient's Preference:   Plan Factors:          Intra-op Monitoring Plan: standard ASA monitors  Intra-op Monitoring Plan Comments:    Post Op Pain Control Plan: multimodal analgesia, IV/PO Opioids PRN and per primary service following discharge from PACU  Post Op Pain Control Plan Comments:     Induction:   IV  Beta Blocker:  Patient is not currently on a Beta-Blocker (No further documentation required).       Informed Consent: Patient understands risks and agrees with Anesthesia plan.  Questions answered. Anesthesia consent signed with patient.  ASA Score: 2  emergent   Day of Surgery Review of History & Physical:    H&P update referred to the surgeon.         Ready For Surgery From Anesthesia Perspective.

## 2022-01-14 NOTE — ED TRIAGE NOTES
Patient is a 46 year old male that presents to the ED via EMS from [place] with wound infection to his right hand. Swelling and redness noted. Pt arrives with a PIV to the right AC.

## 2022-01-14 NOTE — CONSULTS
[facility] - Emergency Dept  Orthopedics  Consult Note    Patient Name: Diaz Serrano Jr.  MRN: [id number]  Admission Date: 1/14/2022  Hospital Length of Stay: 0 days  Attending Provider: Reji Givens DO  Primary Care Provider: Michelle Del Valle MD    Inpatient consult to Orthopedic Surgery  Consult performed by: Eben Coates MD  Consult ordered by: Yuliya Che PA-C        Subjective:     Principal Problem:Flexor tenosynovitis of finger    Chief Complaint:   Chief Complaint   Patient presents with    Hand Injury     Tx from [facility] for hand surgery, on Wednesday his right hand started swelling. Pt A&O x4, ambulatory, denies other problems.         HPI: Diaz Serrano Jr. is a 46 y.o. male with PMH significant for HIV, prediabetes, and HTN presenting with pain and swelling of the right index finger. Pt reports that he noticed a skin crack/pinprick sized skin breakdown on the radial aspect of his index finger over the DIP flexion crease on Wednesday. Since then he has had progressive pain, swelling, and warmth of the index finger. He was prescribed Bactrim which has not helped. Transferred today from [facility] for concern for flexor tenosynovitis. No prior history of soft tissue infections. No history of gout or septic arthritis. Denies numbness, tingling, or any other MSK complaints. 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        Past Medical History:   Diagnosis Date    HIV (human immunodeficiency virus infection)     Hypertension     Pre-diabetes        Past Surgical History:   Procedure Laterality Date    ABDOMINAL SURGERY      ADENOIDECTOMY      COLONOSCOPY N/A 12/15/2021    Procedure: COLONOSCOPY;  Surgeon: Fredrick Cuadra MD;  Location: [address];  Service: Endoscopy;  Laterality: N/A;  fully vaccinated-GT  12/13 pt rescheduled; updated instructions to [facility]    TONSILLECTOMY         Review of patient's allergies indicates:   Allergen Reactions     Pcn [penicillins]        No current facility-administered medications for this encounter.     Current Outpatient Medications   Medication Sig    azelastine-fluticasone (DYMISTA) 137-50 mcg/spray Spry nassal spray 1 spray by Nasal route.    ALPRAZolam (XANAX) 0.25 MG tablet Take 0.25 mg by mouth.    atorvastatin (LIPITOR) 40 MG tablet     cefixime (SUPRAX) 400 mg Cap Take 1 capsule by mouth once daily.    cefUROXime (CEFTIN) 250 MG tablet Take 250 mg by mouth 2 (two) times daily.    cyclobenzaprine (FLEXERIL) 10 MG tablet Take 1 tablet (10 mg total) by mouth 3 (three) times daily as needed for Muscle spasms.    dulaglutide (TRULICITY) 0.75 mg/0.5 mL pen injector Inject into the skin every 7 days.    dulaglutide (TRULICITY) 0.75 mg/0.5 mL pen injector Inject 0.75 mg into the skin.    sisdqdqmew-dcpbygrd-acmpii ala (ODEFSEY) 200-25-25 mg Tab Take by mouth.    hqkaxyennq-euxrombb-otiyaj ala (ODEFSEY) 200-25-25 mg Tab Take by mouth.    ibuprofen (ADVIL,MOTRIN) 800 MG tablet Take 800 mg by mouth 2 (two) times daily as needed for Pain.    lisinopriL-hydrochlorothiazide (PRINZIDE,ZESTORETIC) 10-12.5 mg per tablet Take 1 tablet by mouth once daily.    methylPREDNISolone (MEDROL DOSEPACK) 4 mg tablet use as directed     Family History     Problem Relation (Age of Onset)    Colon cancer Maternal Grandmother        Tobacco Use    Smoking status: Never Smoker    Smokeless tobacco: Never Used   Substance and Sexual Activity    Alcohol use: Not on file    Drug use: Not on file    Sexual activity: Not on file     ROS   Constitutional: negative for fevers  Eyes: negative visual changes  ENT: negative for hearing loss  Respiratory: negative for dyspnea  Cardiovascular: negative for chest pain  Gastrointestinal: negative for abdominal pain  Genitourinary: negative for dysuria  Neurological: negative for headaches  Behavioral/Psych: negative for hallucinations  Endocrine: negative for temperature  intolerance    Objective:     Vital Signs (Most Recent):  Temp: 99 °F (37.2 °C) (01/14/22 0945)  Pulse: 89 (01/14/22 0945)  Resp: 19 (01/14/22 0945)  BP: 124/84 (01/14/22 0945)  SpO2: 97 % (01/14/22 0945) Vital Signs (24h Range):  Temp:  [98.4 °F (36.9 °C)-99 °F (37.2 °C)] 99 °F (37.2 °C)  Pulse:  [82-97] 89  Resp:  [12-20] 19  SpO2:  [96 %-98 %] 97 %  BP: (124-153)/(72-84) 124/84           There is no height or weight on file to calculate BMI.    No intake or output data in the 24 hours ending 01/14/22 1231    Ortho/SPM Exam   Gen:  No acute distress  CV:  Peripherally well-perfused.    Lungs:  Normal respiratory effort.  Head/Neck:  Normocephalic.  Atraumatic.    MSK:  RUE:  - Small crack over DIP flexion crease R IF, cellulitic dorsal IF with areas of prior drainage over dorsal aspect of P1  - Fusiform swelling of R IF  - R IF held in slight flexion  - Exquisitely TTP over R IF flexor sheath  - Pain with passive extension of the R IF DIP  - AROM and PROM of the shoulder, elbow, wrist intact without pain  - Axillary/AIN/PIN/Radial/Median/Ulnar Nerves assessed in isolation without deficit  - SILT throughout  - Compartments soft  - Radial artery palpated   - Capillary Refill <3s    LUE:  - Skin intact throughout, no open wounds  - No swelling  - No ecchymosis, erythema, or signs of cellulitis  - NonTTP throughout  - AROM and PROM of the shoulder, elbow, wrist, and hand intact without pain  - Axillary/AIN/PIN/Radial/Median/Ulnar Nerves assessed in isolation without deficit  - SILT throughout  - Compartments soft  - Radial artery palpated   - Capillary Refill <3s    BLE:  - Skin intact throughout, no open wounds  - No swelling  - No ecchymosis, erythema, or signs of cellulitis  - NonTTP throughout  - AROM and PROM of the hip, knee, ankle, and foot intact without pain  - TA/EHL/Gastroc/FHL assessed in isolation without deficit  - SILT throughout  - Compartments soft  - DP and PT palpated  - Capillary Refill  <3s      Spine/pelvis/axial body:  No tenderness to palpation of cervical, thoracic, or lumbar spine  No pain with compression of pelvis  No chest wall or abdominal tenderness  No decubitus ulcers    Significant Labs:   Recent Lab Results       01/13/22  1803         Acceptable Yes       SARS-CoV-2 RNA, Amplification, Qual Negative           All pertinent labs within the past 24 hours have been reviewed.    Significant Imaging: I have reviewed all pertinent imaging results/findings.     flexor tenosynovitis of the index finger extending to the level of the metacarpal neck    Assessment/Plan:     * Flexor tenosynovitis of finger  Diaz Serrano Jr. is a 46 y.o. male with PMH of HIV, prediabetes, HTN presenting with flexor tenosynovitis of the R IF. He is RHD. 4/4 Kan[place] signs positive. Imaging consistent with flexor tenosynovitis and overlying cellulitis.    --Diagnosis discussed in detail with patient including need for operative intervention in order to treat infection. Pt understands and wishes to proceed with surgical treatment.  --Pt marked and consented.   --Will discuss timing with staff  --Hold abx for now for intraop cultures  --ID consult after surgery for antibiotic recommendations     Risks and complications were discussed including but not limited to the risks of anesthetic complications, infection, wound healing complications, pain, stiffness, DVT, pulmonary embolism, perioperative medical risks (cardiac, pulmonary, renal, neurologic), and death among others were discussed. No guarantees were made and the patient and family elect to proceed. All questions were answered.  No guarantees were implied or stated.  Informed consent was obtained.          Eben Coates MD  Orthopedics  [facility] - Emergency Dept

## 2022-01-14 NOTE — SUBJECTIVE & OBJECTIVE
Past Medical History:   Diagnosis Date    HIV (human immunodeficiency virus infection)     Hypertension     Pre-diabetes        Past Surgical History:   Procedure Laterality Date    ABDOMINAL SURGERY      ADENOIDECTOMY      COLONOSCOPY N/A 12/15/2021    Procedure: COLONOSCOPY;  Surgeon: Fredrick Cuadra MD;  Location: [address];  Service: Endoscopy;  Laterality: N/A;  fully vaccinated-GT  12/13 pt rescheduled; updated instructions to [place]-[facility]    TONSILLECTOMY         Review of patient's allergies indicates:   Allergen Reactions    Pcn [penicillins]        Current Facility-Administered Medications on File Prior to Encounter   Medication    [DISCONTINUED] levoFLOXacin 750 mg/150 mL IVPB 750 mg    [DISCONTINUED] morphine injection 6 mg    [DISCONTINUED] ondansetron disintegrating tablet 4 mg     Current Outpatient Medications on File Prior to Encounter   Medication Sig    azelastine-fluticasone (DYMISTA) 137-50 mcg/spray Spry nassal spray 1 spray by Nasal route.    ALPRAZolam (XANAX) 0.25 MG tablet Take 0.25 mg by mouth.    atorvastatin (LIPITOR) 40 MG tablet     cefixime (SUPRAX) 400 mg Cap Take 1 capsule by mouth once daily.    cefUROXime (CEFTIN) 250 MG tablet Take 250 mg by mouth 2 (two) times daily.    cyclobenzaprine (FLEXERIL) 10 MG tablet Take 1 tablet (10 mg total) by mouth 3 (three) times daily as needed for Muscle spasms.    dulaglutide (TRULICITY) 0.75 mg/0.5 mL pen injector Inject into the skin every 7 days.    dulaglutide (TRULICITY) 0.75 mg/0.5 mL pen injector Inject 0.75 mg into the skin.    qngxgeqrwb-kkeelquf-qrcqxo ala (ODEFSEY) 200-25-25 mg Tab Take by mouth.    ytssffpqaq-brmdnbke-qfhtck ala (ODEFSEY) 200-25-25 mg Tab Take by mouth.    ibuprofen (ADVIL,MOTRIN) 800 MG tablet Take 800 mg by mouth 2 (two) times daily as needed for Pain.    lisinopriL-hydrochlorothiazide (PRINZIDE,ZESTORETIC) 10-12.5 mg per tablet Take 1 tablet by mouth once daily.     methylPREDNISolone (MEDROL DOSEPACK) 4 mg tablet use as directed     Family History     Problem Relation (Age of Onset)    Colon cancer Maternal Grandmother        Tobacco Use    Smoking status: Never Smoker    Smokeless tobacco: Never Used   Substance and Sexual Activity    Alcohol use: Not on file    Drug use: Not on file    Sexual activity: Not on file     Review of Systems   Constitutional: Negative for chills and fever.   HENT: Negative for congestion and sore throat.    Respiratory: Negative for cough and shortness of breath.    Cardiovascular: Negative for chest pain and leg swelling.   Gastrointestinal: Negative for abdominal pain, nausea and vomiting.   Genitourinary: Negative for decreased urine volume, dysuria, frequency and urgency.   Musculoskeletal: Positive for arthralgias and joint swelling. Negative for myalgias.   Skin: Negative for color change and pallor.   Neurological: Negative for dizziness, light-headedness and headaches.   Psychiatric/Behavioral: Negative for agitation and confusion.     Objective:     Vital Signs (Most Recent):  Temp: 98 °F (36.7 °C) (01/14/22 1723)  Pulse: 85 (01/14/22 1723)  Resp: 16 (01/14/22 1723)  BP: (!) 149/78 (01/14/22 1723)  SpO2: 98 % (01/14/22 1723) Vital Signs (24h Range):  Temp:  [98 °F (36.7 °C)-99 °F (37.2 °C)] 98 °F (36.7 °C)  Pulse:  [82-97] 85  Resp:  [12-19] 16  SpO2:  [96 %-98 %] 98 %  BP: (124-153)/(72-84) 149/78     Weight: 113.4 kg (250 lb)  Body mass index is 36.92 kg/m².    Physical Exam  Constitutional:       General: He is not in acute distress.     Appearance: Normal appearance. He is not toxic-appearing or diaphoretic.   Cardiovascular:      Rate and Rhythm: Normal rate and regular rhythm.      Heart sounds: No murmur heard.  No friction rub. No gallop.    Pulmonary:      Effort: Pulmonary effort is normal. No respiratory distress.      Breath sounds: No wheezing or rales.   Abdominal:      General: Abdomen is flat. There is no distension.       Palpations: Abdomen is soft.      Tenderness: There is no abdominal tenderness. There is no guarding or rebound.   Musculoskeletal:      Cervical back: Normal range of motion and neck supple.      Right lower leg: No edema.      Left lower leg: No edema.      Comments: Hand in cast   Skin:     General: Skin is warm and dry.   Neurological:      Mental Status: He is alert.             Significant Labs: All pertinent labs within the past 24 hours have been reviewed.    Significant Imaging: I have reviewed all pertinent imaging results/findings within the past 24 hours.   ,DirectAddress_Unknown

## 2022-01-14 NOTE — ASSESSMENT & PLAN NOTE
Diaz Serrano Jr. is a 46 y.o. male with PMH of HIV, prediabetes, HTN presenting with flexor tenosynovitis of the R IF. He is RHD. 4/4 Kanavel signs positive. Imaging consistent with flexor tenosynovitis and overlying cellulitis.    --Diagnosis discussed in detail with patient including need for operative intervention in order to treat infection. Pt understands and wishes to proceed with surgical treatment.  --Pt marked and consented.   --Will discuss timing with staff  --Hold abx for now for intraop cultures  --ID consult after surgery for antibiotic recommendations     Risks and complications were discussed including but not limited to the risks of anesthetic complications, infection, wound healing complications, pain, stiffness, DVT, pulmonary embolism, perioperative medical risks (cardiac, pulmonary, renal, neurologic), and death among others were discussed. No guarantees were made and the patient and family elect to proceed. All questions were answered.  No guarantees were implied or stated.  Informed consent was obtained.

## 2022-01-14 NOTE — HPI
Diaz Serrano Jr. is a 46 y.o. male with PMH significant for HIV, prediabetes, and HTN presenting with pain and swelling of the right index finger. Pt reports that he noticed a skin crack/pinprick sized skin breakdown on the radial aspect of his index finger over the DIP flexion crease on Wednesday. Since then he has had progressive pain, swelling, and warmth of the index finger. He was prescribed Bactrim which has not helped. Transferred today from [facility] for concern for flexor tenosynovitis. No prior history of soft tissue infections. No history of gout or septic arthritis. Denies numbness, tingling, or any other MSK complaints. 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

## 2022-01-14 NOTE — ASSESSMENT & PLAN NOTE
MRI with flexor tenosynovitis of the index finer extending to the level of the metacarpal neck  Ortho consulted, plan for OR 1/14/22. Hold off on abx as patient is not septic to increase yield of intraoperative cultures. ID consult following surgical intervention. NPO for now.

## 2022-01-14 NOTE — ED PROVIDER NOTES
"Encounter Date: 1/14/2022       History     Chief Complaint   Patient presents with    Hand Injury     Tx from [facility] for hand surgery, on Wednesday his right hand started swelling. Pt A&O x4, ambulatory, denies other problems.      47 y/o M with history of DM, HIV, HTN was transferred to [facility] from [facility] ED for R flexor tenosynovitis.  He reports pain/swelling/erythema to the R 2nd finger on Wednesday.  That day, he was given prescriptions for bactrim and keflex.  Thursday - symptoms worsened so he presented to [facility]. He reports 6-7/10 "throbbing" pain to the finger and hand. He reports chills. He is R hand dominant. He denies fever, chest pain, SOB, nausea, vomiting, diarrhea, headache, lightheadedness.    The history is provided by the patient.     Review of patient's allergies indicates:   Allergen Reactions    Pcn [penicillins]      Past Medical History:   Diagnosis Date    HIV (human immunodeficiency virus infection)     Hypertension     Pre-diabetes      Past Surgical History:   Procedure Laterality Date    ABDOMINAL SURGERY      ADENOIDECTOMY      COLONOSCOPY N/A 12/15/2021    Procedure: COLONOSCOPY;  Surgeon: Fredrick Cuadra MD;  Location: [facility] ([address] [place] [address]);  Service: Endoscopy;  Laterality: N/A;  fully vaccinated-GT  12/13 pt rescheduled; updated instructions to [facility]    TONSILLECTOMY       Family History   Problem Relation Age of Onset    Colon cancer Maternal Grandmother     Esophageal cancer Neg Hx      Social History     Tobacco Use    Smoking status: Never Smoker    Smokeless tobacco: Never Used     Review of Systems   Constitutional: Positive for chills. Negative for fever.   HENT: Negative for congestion, rhinorrhea and sore throat.    Eyes: Negative for photophobia and visual disturbance.   Respiratory: Negative for cough and shortness of breath.    Cardiovascular: Negative for chest pain.   Gastrointestinal: Negative for abdominal pain, constipation, " diarrhea, nausea and vomiting.   Genitourinary: Negative for dysuria and hematuria.   Musculoskeletal: Positive for arthralgias, joint swelling and myalgias.   Skin: Positive for color change and wound.   Neurological: Negative for dizziness, weakness, numbness and headaches.       Physical Exam     Initial Vitals [01/14/22 0945]   BP Pulse Resp Temp SpO2   124/84 89 19 99 °F (37.2 °C) 97 %      MAP       --         Physical Exam    Nursing note and vitals reviewed.  Constitutional: He appears well-developed and well-nourished. He is not diaphoretic. No distress.   HENT:   Head: Normocephalic and atraumatic.   Neck: Neck supple.   Normal range of motion.  Cardiovascular: Normal rate, regular rhythm and normal heart sounds. Exam reveals no gallop and no friction rub.    No murmur heard.  Pulmonary/Chest: Breath sounds normal. He has no wheezes. He has no rhonchi. He has no rales.   Abdominal: Abdomen is soft. Bowel sounds are normal. There is no abdominal tenderness. There is no rebound and no guarding.   Musculoskeletal:         General: Tenderness and edema present. Normal range of motion.      Cervical back: Normal range of motion and neck supple.      Comments: Pain swelling tenderness to the R 2nd finger with cellulitis that extends to the dorsal hand. +tenderness over flexor tendon     Neurological: He is alert and oriented to person, place, and time.   Skin: Skin is warm and dry. No rash noted. There is erythema.   Psychiatric: He has a normal mood and affect.                   ED Course   Procedures  Labs Reviewed - No data to display       Imaging Results           MRI Hand Fingers W WO Contrast Right (Final result)  Result time 01/14/22 11:48:43    Final result by Kamaljit Dean DO (01/14/22 11:48:43)                 Impression:      1. Findings compatible with flexor tenosynovitis of the index finger extending to the level of the metacarpal neck.  2. Diffuse circumferential soft tissue edema enhancement  of the index finger compatible with cellulitis.  Cellulitis also seen to a lesser extent about the thumb, dorsal middle finger, and dorsal/radial aspect of the hand.  No focal rim enhancing fluid collection or large abscess.  This report was flagged in Epic as abnormal.      Electronically signed by: Kamaljit Dean  Date:    01/14/2022  Time:    11:48             Narrative:    EXAMINATION:  MRI HAND FINGERS W WO CONTRAST RIGHT    CLINICAL HISTORY:  suspect flexor tenosynovitis;    TECHNIQUE:  Multisequence, multi planar magnetic resonance imaging of the right hand was performed before and after the intravenous administration of 10 mL Gadavist.    COMPARISON:  Radiographs of the right hand from 01/13/2022.    FINDINGS:  Bone: There is scattered subchondral cystic change and subchondral edema in the proximal pole of the capitate and the radial aspect of the hamate.  Marrow signal is otherwise normal.  There is no evidence of acute osteomyelitis.  There is no acute fracture or dislocation.    Tendons: There is fluid in the index finger flexor tendon sheath with associated enhancement, compatible with tenosynovitis.  This extends approximately to the level of the metacarpal neck.  Remaining flexor and extensor tendons are unremarkable.    Ligaments: Ligaments are grossly intact.    Soft tissues: There is diffuse circumferential soft tissue edema enhancement of the index finger.  To a lesser extent there is soft tissue edema of the thumb and the dorsal aspect of the 3rd finger extending to the dorsal radial hand soft tissues.  There is no evidence of a focal rim enhancing fluid collection.    Articulations: There is no large joint effusion.                                 Medications   gadobutroL (GADAVIST) injection 10 mL (10 mLs Intravenous Given 1/14/22 1127)   morphine injection 4 mg (4 mg Intravenous Given 1/14/22 1420)     Medical Decision Making:   History:   Old Medical Records: I decided to obtain old medical  records.  Old Records Summarized: records from clinic visits and records from previous admission(s).       <> Summary of Records: [facility] ED visit yesterday - WBC 13.71, CRP 38.7. given vancomycin 1750mg at 09:30 yesterday and levofloxacin 750mf at 11:50 yesterday. COVID negative.  Clinical Tests:   Lab Tests: Reviewed  Radiological Study: Reviewed  Other:   I have discussed this case with another health care provider.       <> Summary of the Discussion: Orthopedics       APC / Resident Notes:   47 y/o M with history of DM, HIV, HTN was transferred to [facility] from [facility] ED for R flexor tenosynovitis. VSS. Swelling, tenderness, erythema noted to the R 2nd finger. Cellulitis extends to dorsal hand. Tenderness over the flexor tendon. See above photos. Received IV vancomycin and levofloxacin at [facility] ED (last dose yesterday).     Discussed with orthopedics and they evaluated in the ED. MRI hand ordered which shows flexor tenosynovitis of the R index finger.     Hold abx for now so they can get cultures in the OR. Plan to take to the OR this evening.     Due to medical co-morbidities - placed in observation to .                  Clinical Impression:   Final diagnoses:  [M65.9] Flexor tenosynovitis of finger (Primary)          ED Disposition Condition    Observation               Yuliya Che PA-C  01/14/22 7190

## 2022-01-14 NOTE — HPI
Diaz Serrano Jr. Is a 46 y.o. male with past medical history of HIV, HTN, HLD who presented as transfer from [facility] for Orthopedic hand surgery services. Patient states symptoms started about three days ago, initially with small part of skin breakdown on the distal part of his index finger. Over the next several days he had progressive swelling and redness of the area along with concurrent pain. He was given bactrim but only was able to take one dose before he presented. He was then transferred to [facility] for concern for flexor tenosynovitis. He was admitted to hospital medicine with orthopedic surgery consult.

## 2022-01-15 LAB
BASOPHILS # BLD AUTO: 0.04 K/UL (ref 0–0.2)
BASOPHILS NFR BLD: 0.3 % (ref 0–1.9)
CRP SERPL-MCNC: 138 MG/L (ref 0–8.2)
DIFFERENTIAL METHOD: ABNORMAL
EOSINOPHIL # BLD AUTO: 0 K/UL (ref 0–0.5)
EOSINOPHIL NFR BLD: 0 % (ref 0–8)
ERYTHROCYTE [DISTWIDTH] IN BLOOD BY AUTOMATED COUNT: 15.1 % (ref 11.5–14.5)
HCT VFR BLD AUTO: 55.7 % (ref 40–54)
HGB BLD-MCNC: 18.3 G/DL (ref 14–18)
IMM GRANULOCYTES # BLD AUTO: 0.19 K/UL (ref 0–0.04)
IMM GRANULOCYTES NFR BLD AUTO: 1.4 % (ref 0–0.5)
LYMPHOCYTES # BLD AUTO: 0.9 K/UL (ref 1–4.8)
LYMPHOCYTES NFR BLD: 7.1 % (ref 18–48)
MCH RBC QN AUTO: 29.3 PG (ref 27–31)
MCHC RBC AUTO-ENTMCNC: 32.9 G/DL (ref 32–36)
MCV RBC AUTO: 89 FL (ref 82–98)
MONOCYTES # BLD AUTO: 0.4 K/UL (ref 0.3–1)
MONOCYTES NFR BLD: 3 % (ref 4–15)
NEUTROPHILS # BLD AUTO: 11.6 K/UL (ref 1.8–7.7)
NEUTROPHILS NFR BLD: 88.2 % (ref 38–73)
NRBC BLD-RTO: 0 /100 WBC
PLATELET # BLD AUTO: 295 K/UL (ref 150–450)
PMV BLD AUTO: 10.6 FL (ref 9.2–12.9)
POCT GLUCOSE: 136 MG/DL (ref 70–110)
POCT GLUCOSE: 160 MG/DL (ref 70–110)
RBC # BLD AUTO: 6.24 M/UL (ref 4.6–6.2)
WBC # BLD AUTO: 13.16 K/UL (ref 3.9–12.7)

## 2022-01-15 PROCEDURE — 99204 OFFICE O/P NEW MOD 45 MIN: CPT | Mod: ,,, | Performed by: STUDENT IN AN ORGANIZED HEALTH CARE EDUCATION/TRAINING PROGRAM

## 2022-01-15 PROCEDURE — 99226 PR SUBSEQUENT OBSERVATION CARE,LEVEL III: CPT | Mod: ,,, | Performed by: STUDENT IN AN ORGANIZED HEALTH CARE EDUCATION/TRAINING PROGRAM

## 2022-01-15 PROCEDURE — 85025 COMPLETE CBC W/AUTO DIFF WBC: CPT | Performed by: STUDENT IN AN ORGANIZED HEALTH CARE EDUCATION/TRAINING PROGRAM

## 2022-01-15 PROCEDURE — 94761 N-INVAS EAR/PLS OXIMETRY MLT: CPT

## 2022-01-15 PROCEDURE — 25000003 PHARM REV CODE 250: Performed by: STUDENT IN AN ORGANIZED HEALTH CARE EDUCATION/TRAINING PROGRAM

## 2022-01-15 PROCEDURE — 99226 PR SUBSEQUENT OBSERVATION CARE,LEVEL III: ICD-10-PCS | Mod: ,,, | Performed by: STUDENT IN AN ORGANIZED HEALTH CARE EDUCATION/TRAINING PROGRAM

## 2022-01-15 PROCEDURE — 86140 C-REACTIVE PROTEIN: CPT | Performed by: STUDENT IN AN ORGANIZED HEALTH CARE EDUCATION/TRAINING PROGRAM

## 2022-01-15 PROCEDURE — 99204 PR OFFICE/OUTPT VISIT, NEW, LEVL IV, 45-59 MIN: ICD-10-PCS | Mod: ,,, | Performed by: STUDENT IN AN ORGANIZED HEALTH CARE EDUCATION/TRAINING PROGRAM

## 2022-01-15 PROCEDURE — 63600175 PHARM REV CODE 636 W HCPCS: Performed by: STUDENT IN AN ORGANIZED HEALTH CARE EDUCATION/TRAINING PROGRAM

## 2022-01-15 PROCEDURE — G0378 HOSPITAL OBSERVATION PER HR: HCPCS

## 2022-01-15 RX ADMIN — OXYCODONE HYDROCHLORIDE 10 MG: 10 TABLET ORAL at 05:01

## 2022-01-15 RX ADMIN — RILPIVIRINE HYDROCHLORIDE 25 MG: 25 TABLET, FILM COATED ORAL at 09:01

## 2022-01-15 RX ADMIN — EMTRICITABINE AND TENOFOVIR ALAFENAMIDE 1 TABLET: 200; 25 TABLET ORAL at 09:01

## 2022-01-15 RX ADMIN — CEFTRIAXONE 1 G: 1 INJECTION, SOLUTION INTRAVENOUS at 03:01

## 2022-01-15 RX ADMIN — VANCOMYCIN HYDROCHLORIDE 1750 MG: 500 INJECTION, POWDER, LYOPHILIZED, FOR SOLUTION INTRAVENOUS at 11:01

## 2022-01-15 RX ADMIN — OXYCODONE HYDROCHLORIDE 10 MG: 10 TABLET ORAL at 09:01

## 2022-01-15 RX ADMIN — OXYCODONE HYDROCHLORIDE 10 MG: 10 TABLET ORAL at 03:01

## 2022-01-15 RX ADMIN — VANCOMYCIN HYDROCHLORIDE 1750 MG: 500 INJECTION, POWDER, LYOPHILIZED, FOR SOLUTION INTRAVENOUS at 10:01

## 2022-01-15 RX ADMIN — LISINOPRIL AND HYDROCHLOROTHIAZIDE 1 TABLET: 12.5; 1 TABLET ORAL at 08:01

## 2022-01-15 RX ADMIN — ATORVASTATIN CALCIUM 40 MG: 10 TABLET, FILM COATED ORAL at 08:01

## 2022-01-15 NOTE — ASSESSMENT & PLAN NOTE
MRI with flexor tenosynovitis of the index finer extending to the level of the metacarpal neck  Ortho consulted, s/p I&D 1/14/22. ID consult following surgical intervention for antibiotic recommendations. Start vancomycin, rocephin. Follow operative culture results.

## 2022-01-15 NOTE — PROGRESS NOTES
pr  Pharmacokinetic Initial Assessment: IV Vancomycin    Assessment/Plan:    Initiate intravenous vancomycin 1750 mg q12h  Desired empiric serum trough concentration is 15 to 20 mcg/mL  Draw vancomycin trough level 60 min prior to fourth dose on 1/16 at approximately 1045  Pharmacy will continue to follow and monitor vancomycin.      Please contact pharmacy at extension [phone] with any questions regarding this assessment.     Thank you for the consult,   Chad Johnson       Patient brief summary:  Diaz Serrano Jr. is a 46 y.o. male initiated on antimicrobial therapy with IV Vancomycin for treatment of suspected skin & soft tissue infection    Drug Allergies:   Review of patient's allergies indicates:   Allergen Reactions    Pcn [penicillins]        Actual Body Weight:   113.4 kg    Renal Function:   Estimated Creatinine Clearance: 97.6 mL/min (based on SCr of 1.2 mg/dL).,     Dialysis Method (if applicable):  N/A    CBC (last 72 hours):  Recent Labs   Lab Result Units 01/13/22  0843   WBC K/uL 13.71*   Hemoglobin g/dL 18.2*   Hematocrit % 54.2*   Platelets K/uL 255   Gran % % 72.6   Lymph % % 14.4*   Mono % % 9.4   Eosinophil % % 1.2   Basophil % % 0.6   Differential Method  Automated       Metabolic Panel (last 72 hours):  Recent Labs   Lab Result Units 01/13/22  0843   Sodium mmol/L 134*   Potassium mmol/L 4.0   Chloride mmol/L 102   CO2 mmol/L 20*   Glucose mg/dL 114*   BUN mg/dL 16   Creatinine mg/dL 1.2   Albumin g/dL 3.2*   Total Bilirubin mg/dL 0.5   Alkaline Phosphatase U/L 38*   AST U/L 51*   ALT U/L 85*       Drug levels (last 3 results):  No results for input(s): VANCOMYCINRA, VANCOMYCINPE, VANCOMYCINTR in the last 72 hours.    Microbiologic Results:  Microbiology Results (last 7 days)     Procedure Component Value Units Date/Time    Gram stain [[id number]] Collected: 01/14/22 1813    Order Status: Completed Specimen: Incision site from Finger, Right Hand Updated: 01/14/22 2138     Gram Stain  Result Rare WBC's      No organisms seen    Narrative:      Right index finger 1    Gram stain [[id number]] Collected: 01/14/22 1813    Order Status: Completed Specimen: Incision site from Finger, Right Hand Updated: 01/14/22 2135     Gram Stain Result Rare WBC's      No organisms seen    Narrative:      Right index finger 2    Aerobic culture [[id number]] Collected: 01/14/22 1813    Order Status: Sent Specimen: Incision site from Finger, Right Hand Updated: 01/14/22 1848    Culture, Anaerobe [[id number]] Collected: 01/14/22 1813    Order Status: Sent Specimen: Incision site from Finger, Right Hand Updated: 01/14/22 1845    AFB Culture & Smear [[id number]] Collected: 01/14/22 1813    Order Status: Sent Specimen: Incision site from Finger, Right Hand Updated: 01/14/22 1844    Aerobic culture [[id number]] Collected: 01/14/22 1813    Order Status: Sent Specimen: Incision site from Finger, Right Hand Updated: 01/14/22 1843    AFB Culture & Smear [[id number]] Collected: 01/14/22 1813    Order Status: Sent Specimen: Incision site from Finger, Right Hand Updated: 01/14/22 1832    Fungus culture [[id number]] Collected: 01/14/22 1813    Order Status: Sent Specimen: Incision site from Finger, Right Hand Updated: 01/14/22 1832    Fungus culture [[id number]] Collected: 01/14/22 1813    Order Status: Sent Specimen: Incision site from Finger, Right Hand Updated: 01/14/22 1831    Culture, Anaerobe [[id number]] Collected: 01/14/22 1813    Order Status: Sent Specimen: Incision site from Finger, Right Hand Updated: 01/14/22 1831

## 2022-01-15 NOTE — ASSESSMENT & PLAN NOTE
Flexor tenosynovitis of right index finger s/p I&D and tenosynovectomy 1/14/22.  No apparent trauma, no distinctive exposures or risk factors to suspect less typical pathogens.  Also received multiple beta lactams in the past without issue.    Recommendations:  -Vancomycin dosing per pharmacy  -Ceftriaxone 2g IV q24h  -Will follow-up intraoperative culture results  -Further surgical management per orthopedics

## 2022-01-15 NOTE — CONSULTS
[facility] - Surgery  Infectious Disease  Consult Note    Patient Name: Diaz Serrano Jr.  MRN: [id number]  Admission Date: 1/14/2022  Hospital Length of Stay: 0 days  Attending Physician: Linden Hinojosa*  Primary Care Provider: Michelle Del Valle MD     Isolation Status: No active isolations    Patient information was obtained from patient and ER records.      Inpatient consult to Infectious Diseases  Consult performed by: Eben Mims MD  Consult ordered by: Cale Smalls MD        Assessment/Plan:     * Flexor tenosynovitis of finger  Flexor tenosynovitis of right index finger s/p I&D and tenosynovectomy 1/14/22.  No apparent trauma, no distinctive exposures or risk factors to suspect less typical pathogens.  Also received multiple beta lactams in the past without issue.    Recommendations:  -Vancomycin dosing per pharmacy  -Ceftriaxone 2g IV q24h  -Will follow-up intraoperative culture results  -Further surgical management per orthopedics    HIV (human immunodeficiency virus infection)  On Odefsey, appears well-maintained and reportedly immunocompetent with CD4 1200's a few months ago.  Can recheck while inpatient.    Recommendations:  -Continue Tenofovir, emtricitabine, and rilpivirine (Odefsey)  -Check viral load, CD4 count  -No OI ppx indicated currently        Thank you for your consult. I will follow-up with patient. Please contact us if you have any additional questions.    Eben Mims MD  Infectious Disease  [facility] - Surgery    Subjective:     Principal Problem: Flexor tenosynovitis of finger    HPI: 46-year-old male with HTN, HIV transferred from H for infection of his right index finger.  3 days ago, the patient had noticed sudden onset of pain and swelling of his right index finger after waking.  Was initially given Bactrim however noted spreading of redness from the distal joint of his finger prompting presentation to the ED and subsequent [place] [facility]  I&D and  tenosynovectomy on 22, with immediate purulence encountered.  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  Denies any recent noticeable cuts or trauma to the finger, or any particular hobbies that would include exposure to water, plants, or more wildlife.  Does have a dog but does not play bite nor has it bitten the patient recently.    In regards to HIV, was diagnosed , unknown inciting event.  Has regular follow-up with his HIV/ID specialist in [place].  Previously on Atripla, currently on Odefsey, fully adherent.  Reports his viral load undetectable, CD4 count ~1200, last checked a few months ago.  MSM, monogamous relationship with his , reporting distant history of chlamydia.  Denies IVDU.  Received all of his tattoos in reputable parlors.    In regards to antibiotic allergy history, has received Augmentin and cephalosporins in the past without issue.      Past Medical History:   Diagnosis Date    HIV (human immunodeficiency virus infection)     Hypertension     Pre-diabetes        Past Surgical History:   Procedure Laterality Date    ABDOMINAL SURGERY      ADENOIDECTOMY      COLONOSCOPY N/A 12/15/2021    Procedure: COLONOSCOPY;  Surgeon: Fredrick Cuadra MD;  Location: [address]);  Service: Endoscopy;  Laterality: N/A;  fully vaccinated-GT   pt rescheduled; updated instructions to [facility]    TONSILLECTOMY         Review of patient's allergies indicates:   Allergen Reactions    Pcn [penicillins]        Medications:  Medications Prior to Admission   Medication Sig    azelastine-fluticasone (DYMISTA) 137-50 mcg/spray Spry nassal spray 1 spray by Nasal route.    ALPRAZolam (XANAX) 0.25 MG tablet Take 0.25 mg by mouth.    atorvastatin (LIPITOR) 40 MG tablet     cefixime (SUPRAX) 400 mg Cap Take 1 capsule by mouth once daily.    cefUROXime (CEFTIN) 250 MG tablet Take 250 mg by mouth 2 (two) times daily.    [] cyclobenzaprine (FLEXERIL) 10 MG tablet Take 1  "tablet (10 mg total) by mouth 3 (three) times daily as needed for Muscle spasms.    dulaglutide (TRULICITY) 0.75 mg/0.5 mL pen injector Inject into the skin every 7 days.    dulaglutide (TRULICITY) 0.75 mg/0.5 mL pen injector Inject 0.75 mg into the skin.    zxmiqykaxp-lbnmsksq-cltjoo ala (ODEFSEY) 200-25-25 mg Tab Take by mouth.    cfzupvxjnb-bwlejaky-zmifiw ala (ODEFSEY) 200-25-25 mg Tab Take by mouth.    ibuprofen (ADVIL,MOTRIN) 800 MG tablet Take 800 mg by mouth 2 (two) times daily as needed for Pain.    lisinopriL-hydrochlorothiazide (PRINZIDE,ZESTORETIC) 10-12.5 mg per tablet Take 1 tablet by mouth once daily.    methylPREDNISolone (MEDROL DOSEPACK) 4 mg tablet use as directed     Antibiotics (From admission, onward)            Start     Stop Route Frequency Ordered    01/15/22 1515  cefTRIAXone (ROCEPHIN) 1 g/50 mL D5W IVPB         -- IV Every 24 hours (non-standard times) 01/15/22 1412    01/14/22 2345  vancomycin 1.75 g in 5 % dextrose 500 mL IVPB         -- IV Every 12 hours (non-standard times) 01/14/22 2241    01/14/22 2312  vancomycin - pharmacy to dose  (vancomycin IVPB)        "And" Linked Group Details    -- IV pharmacy to manage frequency 01/14/22 2212        Antifungals (From admission, onward)            None        Antivirals (From admission, onward)        Stop Route Frequency     Descovy        "And" Linked Group Details    -- Oral Daily     Edurant        "And" Linked Group Details    -- Oral Daily           Immunization History   Administered Date(s) Administered    COVID-19, MRNA, LN-S, PF (Pfizer) 12/17/2020, 01/06/2021, 12/01/2021       Family History     Problem Relation (Age of Onset)    Colon cancer Maternal Grandmother        Social History     Socioeconomic History    Marital status:    Tobacco Use    Smoking status: Never Smoker    Smokeless tobacco: Never Used     Review of Systems   Constitutional: Negative for chills and fever.   HENT: Negative for sore throat.  "   Eyes: Negative for redness.   Respiratory: Negative for cough and shortness of breath.    Gastrointestinal: Negative for diarrhea, nausea and vomiting.   Musculoskeletal: Positive for joint swelling.   Skin: Positive for wound. Negative for rash.   Neurological: Negative for headaches.   Psychiatric/Behavioral: Negative for agitation, behavioral problems, confusion and decreased concentration.   All other systems reviewed and are negative.    Objective:     Vital Signs (Most Recent):  Temp: 97.8 °F (36.6 °C) (01/15/22 1250)  Pulse: 80 (01/15/22 1250)  Resp: 16 (01/15/22 1527)  BP: (!) 147/80 (01/15/22 1250)  SpO2: 97 % (01/15/22 1250) Vital Signs (24h Range):  Temp:  [96.6 °F (35.9 °C)-98.6 °F (37 °C)] 97.8 °F (36.6 °C)  Pulse:  [77-95] 80  Resp:  [12-22] 16  SpO2:  [91 %-99 %] 97 %  BP: (122-159)/(61-86) 147/80     Weight: 113.4 kg (250 lb)  Body mass index is 36.92 kg/m².    Estimated Creatinine Clearance: 97.6 mL/min (based on SCr of 1.2 mg/dL).    Physical Exam  Vitals reviewed.   Constitutional:       General: He is not in acute distress.     Appearance: He is obese. He is not ill-appearing or toxic-appearing.   HENT:      Head: Normocephalic and atraumatic.      Right Ear: External ear normal.      Left Ear: External ear normal.      Nose: Nose normal.      Mouth/Throat:      Mouth: Mucous membranes are moist.      Pharynx: Oropharynx is clear.   Eyes:      General: No scleral icterus.        Right eye: No discharge.         Left eye: No discharge.      Extraocular Movements: Extraocular movements intact.      Conjunctiva/sclera: Conjunctivae normal.   Cardiovascular:      Rate and Rhythm: Normal rate and regular rhythm.      Heart sounds: Normal heart sounds.   Pulmonary:      Effort: Pulmonary effort is normal.      Breath sounds: Normal breath sounds.   Abdominal:      General: Abdomen is flat.      Palpations: Abdomen is soft.   Musculoskeletal:         General: Normal range of motion.      Cervical  back: Normal range of motion.      Comments: -right hand wrapped, photos reviewed   Skin:     General: Skin is warm and dry.   Neurological:      Mental Status: He is alert and oriented to person, place, and time. Mental status is at baseline.   Psychiatric:         Mood and Affect: Mood normal.         Behavior: Behavior normal.         Thought Content: Thought content normal.         Judgment: Judgment normal.         Significant Labs: All pertinent labs within the past 24 hours have been reviewed.    Significant Imaging: I have reviewed all pertinent imaging results/findings within the past 24 hours.

## 2022-01-15 NOTE — OP NOTE
[facility] - Surgery ([facility])  Orthopedic Surgery Operative Note    OPERATIVE REPORT     Date of Procedure: 1/14/2022     Procedure:   1. Incision and drainage right index finger  2. Tenosynovectomy right index finger    Surgeon(s) and Role:     * Remi Driscoll MD - Primary     * Moe Mann MD - Resident - Assisting     * Cale Smalls MD, -Resident - Assisting        Pre-Operative Diagnosis: Flexor tenosynovitis of finger [M65.9]    Post-Operative Diagnosis: Post-Op Diagnosis Codes:     * Flexor tenosynovitis of finger [M65.9]    Anesthesia: General     Findings of the Procedure: Gross purulence in superficial soft tissues as well as flexor tendon sheath with inflamed tenosynovium    Complications: No    Estimated Blood Loss (EBL): 1cc           Implants: none    Specimens: None    Perioperative Antibiotics: held until cultures taken and then clindamycin given due to penicillin allergy           Condition: Good    Disposition: PACU - hemodynamically stable.    Indications for Procedure:  Diaz Serrano Jr. is a 46 y.o. male with PMH significant for HIV, prediabetes, and HTN presenting with pain and swelling of the right index finger. Pt reports that he noticed a skin crack/pinprick sized skin breakdown on the radial aspect of his index finger over the DIP flexion crease on Wednesday. Since then he has had progressive pain, swelling, and warmth of the index finger. He was prescribed Bactrim which has not helped. Transferred today from [facility] for concern for flexor tenosynovitis. No prior history of soft tissue infections. No history of gout or septic arthritis. Denies numbness, tingling, or any other MSK complaints. 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 The risks, benefits, and alternatives to surgical intervention were discussed with the patient and the family and they wished to proceed with surgical  intervention. The risks, benefits and alternatives to surgery were discussed with the patient at great length.  These include pain, bleeding, infection, vessel/nerve damage, numbness, tingling, complex regional pain syndrome, recurrent infection need for further surgery, scarring,  wound healing complications requiring further procedure for soft tissue coverage including flap coverage, cartilage damage,  DVT, PE, arthritis and death.  Patient states an understanding and wishes to proceed with surgery.   All questions were answered.  No guarantees were implied or stated.  Informed consent was obtained.      Procedure in Detail:  The patient was marked in the preoperative holding area with the surgeon's initials and corrected side/site of procedure. The patient was brought to the operating room and placed on the operating table. General anesthesia was induced. The operative extremity was prepped and draped in standard sterile fashion. A formal timeout was performed confirming correct patient, side, site, and procedure.     Incision was made over volar distal phalanx and middle phalanx in prince fashion with immediate purulence encountered which was cultured. Dissection carried down to flexor tendon and sheath was opened to facilitate irrigation. Another incision centered over the A1 pulley of the index finger of the right hand was made. A1 pulley was visualized and incised in its entirety. Tendon sheath was visualized and found to have inflammatory tenosynovium. Small segment of tenosynovium was excised and sent to pathology. Additional cultures of inflammatory fluid taken at this time. Using a 16G angiocath the flexor tendon sheath was irrigated from proximal to distal. 300 ml normal saline washed through the sheath. Additional small incisions made in midaxial ulnar middle phalanx and at the level of the PIP joint with minimal cloudy fluid expressed. Finally a small incision over the volar proximal phalanx was made  with no purulence expressed. All incisions were then irrigated again with a total of 3L normal saline.    Incisions were closed using 4-0 nylon for skin.  Xeroform, 4x4s and sterile cast padding were applied and a soft dressing applied to the finger. He was awakened from anesthesia and then taken to the PACU in stable condition.    Plan:   Consult ID. F/u OR cultures. Take dressings down in 2 days and evaluate finger. F/u CRP to be performed in 2 days.

## 2022-01-15 NOTE — SUBJECTIVE & OBJECTIVE
Past Medical History:   Diagnosis Date    HIV (human immunodeficiency virus infection)     Hypertension     Pre-diabetes        Past Surgical History:   Procedure Laterality Date    ABDOMINAL SURGERY      ADENOIDECTOMY      COLONOSCOPY N/A 12/15/2021    Procedure: COLONOSCOPY;  Surgeon: Fredrick Cuadra MD;  Location: [address];  Service: Endoscopy;  Laterality: N/A;  fully vaccinated-GT   pt rescheduled; updated instructions to [place]    TONSILLECTOMY         Review of patient's allergies indicates:   Allergen Reactions    Pcn [penicillins]        Medications:  Medications Prior to Admission   Medication Sig    azelastine-fluticasone (DYMISTA) 137-50 mcg/spray Spry nassal spray 1 spray by Nasal route.    ALPRAZolam (XANAX) 0.25 MG tablet Take 0.25 mg by mouth.    atorvastatin (LIPITOR) 40 MG tablet     cefixime (SUPRAX) 400 mg Cap Take 1 capsule by mouth once daily.    cefUROXime (CEFTIN) 250 MG tablet Take 250 mg by mouth 2 (two) times daily.    [] cyclobenzaprine (FLEXERIL) 10 MG tablet Take 1 tablet (10 mg total) by mouth 3 (three) times daily as needed for Muscle spasms.    dulaglutide (TRULICITY) 0.75 mg/0.5 mL pen injector Inject into the skin every 7 days.    dulaglutide (TRULICITY) 0.75 mg/0.5 mL pen injector Inject 0.75 mg into the skin.    wlyalbvzru-oldgwxqk-gihkbk ala (ODEFSEY) 200-25-25 mg Tab Take by mouth.    cogxbcvlfe-svojqkkr-mjylsf ala (ODEFSEY) 200-25-25 mg Tab Take by mouth.    ibuprofen (ADVIL,MOTRIN) 800 MG tablet Take 800 mg by mouth 2 (two) times daily as needed for Pain.    lisinopriL-hydrochlorothiazide (PRINZIDE,ZESTORETIC) 10-12.5 mg per tablet Take 1 tablet by mouth once daily.    methylPREDNISolone (MEDROL DOSEPACK) 4 mg tablet use as directed     Antibiotics (From admission, onward)            Start     Stop Route Frequency Ordered    01/15/22 8679  cefTRIAXone (ROCEPHIN) 1 g/50 mL D5W IVPB         -- IV Every 24 hours (non-standard  "times) 01/15/22 1412    01/14/22 2345  vancomycin 1.75 g in 5 % dextrose 500 mL IVPB         -- IV Every 12 hours (non-standard times) 01/14/22 2241    01/14/22 2312  vancomycin - pharmacy to dose  (vancomycin IVPB)        "And" Linked Group Details    -- IV pharmacy to manage frequency 01/14/22 2212        Antifungals (From admission, onward)            None        Antivirals (From admission, onward)        Stop Route Frequency     Descovy        "And" Linked Group Details    -- Oral Daily     [place]ant        "And" Linked Group Details    -- Oral Daily           Immunization History   Administered Date(s) Administered    COVID-19, MRNA, LN-S, PF (Pfizer) 12/17/2020, 01/06/2021, 12/01/2021       Family History     Problem Relation (Age of Onset)    Colon cancer Maternal Grandmother        Social History     Socioeconomic History    Marital status:    Tobacco Use    Smoking status: Never Smoker    Smokeless tobacco: Never Used     Review of Systems   Constitutional: Negative for chills and fever.   HENT: Negative for sore throat.    Eyes: Negative for redness.   Respiratory: Negative for cough and shortness of breath.    Gastrointestinal: Negative for diarrhea, nausea and vomiting.   Musculoskeletal: Positive for joint swelling.   Skin: Positive for wound. Negative for rash.   Neurological: Negative for headaches.   Psychiatric/Behavioral: Negative for agitation, behavioral problems, confusion and decreased concentration.   All other systems reviewed and are negative.    Objective:     Vital Signs (Most Recent):  Temp: 97.8 °F (36.6 °C) (01/15/22 1250)  Pulse: 80 (01/15/22 1250)  Resp: 16 (01/15/22 1527)  BP: (!) 147/80 (01/15/22 1250)  SpO2: 97 % (01/15/22 1250) Vital Signs (24h Range):  Temp:  [96.6 °F (35.9 °C)-98.6 °F (37 °C)] 97.8 °F (36.6 °C)  Pulse:  [77-95] 80  Resp:  [12-22] 16  SpO2:  [91 %-99 %] 97 %  BP: (122-159)/(61-86) 147/80     Weight: 113.4 kg (250 lb)  Body mass index is 36.92 " kg/m².    Estimated Creatinine Clearance: 97.6 mL/min (based on SCr of 1.2 mg/dL).    Physical Exam  Vitals reviewed.   Constitutional:       General: He is not in acute distress.     Appearance: He is obese. He is not ill-appearing or toxic-appearing.   HENT:      Head: Normocephalic and atraumatic.      Right Ear: External ear normal.      Left Ear: External ear normal.      Nose: Nose normal.      Mouth/Throat:      Mouth: Mucous membranes are moist.      Pharynx: Oropharynx is clear.   Eyes:      General: No scleral icterus.        Right eye: No discharge.         Left eye: No discharge.      Extraocular Movements: Extraocular movements intact.      Conjunctiva/sclera: Conjunctivae normal.   Cardiovascular:      Rate and Rhythm: Normal rate and regular rhythm.      Heart sounds: Normal heart sounds.   Pulmonary:      Effort: Pulmonary effort is normal.      Breath sounds: Normal breath sounds.   Abdominal:      General: Abdomen is flat.      Palpations: Abdomen is soft.   Musculoskeletal:         General: Normal range of motion.      Cervical back: Normal range of motion.      Comments: -right hand wrapped, photos reviewed   Skin:     General: Skin is warm and dry.   Neurological:      Mental Status: He is alert and oriented to person, place, and time. Mental status is at baseline.   Psychiatric:         Mood and Affect: Mood normal.         Behavior: Behavior normal.         Thought Content: Thought content normal.         Judgment: Judgment normal.         Significant Labs: All pertinent labs within the past 24 hours have been reviewed.    Significant Imaging: I have reviewed all pertinent imaging results/findings within the past 24 hours.

## 2022-01-15 NOTE — PLAN OF CARE
Recovered from anesthesia, sleepy but easy to rouse, oriented. VS stable on room air. Comfortable pain level at this time. Dressing on surgical site intact and dry. Next of kin updated, room number given. Report given to LYNDON Marks in [facility].

## 2022-01-15 NOTE — ASSESSMENT & PLAN NOTE
On Odefsey, appears well-maintained and reportedly immunocompetent with CD4 1200's a few months ago.  Can recheck while inpatient,    Recommendations:  -Continue Tenofovir, emtricitabine, and rilpivirine (Odefsey)  -Check viral load, CD4 count  -No OI ppx indicated currently

## 2022-01-15 NOTE — HOSPITAL COURSE
Patient was admitted to hospital medicine with orthopedic surgery consult. He underwent I&D 1/14/22. He was started on vancomycin and rocephin. Post-operatively patient did well and did not require repeat intervention. Infectious disease consulted. Patient discharged per recommendations Cefadroxil PO 1g twice daily for 10 more days. Return precautions and appropriate follow up given.

## 2022-01-15 NOTE — NURSING TRANSFER
Nursing Transfer Note      2022     Reason patient is being transferred: PostOP    Transfer To: 545     Transfer via stretcher    Transfer with : personal belongin black backpack, clothes and shoes in a plastic bag  Transported by PCT    Medicines sent: none    Any special needs or follow-up needed:     Chart send with patient: Yes    Notified: ex     Patient reassessed at: 22

## 2022-01-15 NOTE — SUBJECTIVE & OBJECTIVE
Interval History: Pt feels like pain well controlled.     Review of Systems   Constitutional: Negative for chills and fever.   HENT: Negative for congestion and sore throat.    Respiratory: Negative for cough and shortness of breath.    Cardiovascular: Negative for chest pain and leg swelling.   Gastrointestinal: Negative for abdominal pain, nausea and vomiting.   Genitourinary: Negative for decreased urine volume, dysuria, frequency and urgency.   Musculoskeletal: Positive for arthralgias and joint swelling. Negative for myalgias.   Skin: Negative for color change and pallor.   Neurological: Negative for dizziness, light-headedness and headaches.   Psychiatric/Behavioral: Negative for agitation and confusion.     Objective:     Vital Signs (Most Recent):  Temp: 97.5 °F (36.4 °C) (01/15/22 0750)  Pulse: 77 (01/15/22 0750)  Resp: 18 (01/15/22 0750)  BP: (!) 140/79 (01/15/22 0750)  SpO2: 95 % (01/15/22 0828) Vital Signs (24h Range):  Temp:  [96.6 °F (35.9 °C)-98.6 °F (37 °C)] 97.5 °F (36.4 °C)  Pulse:  [77-95] 77  Resp:  [12-22] 18  SpO2:  [91 %-99 %] 95 %  BP: (122-159)/(61-86) 140/79     Weight: 113.4 kg (250 lb)  Body mass index is 36.92 kg/m².  No intake or output data in the 24 hours ending 01/15/22 1409   Physical Exam  Constitutional:       General: He is not in acute distress.     Appearance: Normal appearance. He is not toxic-appearing or diaphoretic.   Cardiovascular:      Rate and Rhythm: Normal rate and regular rhythm.      Heart sounds: No murmur heard.  No friction rub. No gallop.    Pulmonary:      Effort: Pulmonary effort is normal. No respiratory distress.      Breath sounds: No wheezing or rales.   Abdominal:      General: Abdomen is flat. There is no distension.      Palpations: Abdomen is soft.      Tenderness: There is no abdominal tenderness. There is no guarding or rebound.   Musculoskeletal:      Cervical back: Normal range of motion and neck supple.      Right lower leg: No edema.      Left  lower leg: No edema.      Comments: Hand in cast   Skin:     General: Skin is warm and dry.   Neurological:      Mental Status: He is alert.         Computed MELD-Na score unavailable. Necessary lab results were not found in the last year.  Computed MELD score unavailable. Necessary lab results were not found in the last year.    Significant Labs:  CBC:  Recent Labs   Lab 01/15/22  0220   WBC 13.16*   HGB 18.3*   HCT 55.7*        CMP:  No results for input(s): NA, K, CL, CO2, GLU, BUN, CREATININE, CALCIUM, PROT, ALBUMIN, BILITOT, ALKPHOS, AST, ALT, ANIONGAP, EGFRNONAA in the last 48 hours.    Invalid input(s): ESTGFAFRICA  PTINR:  No results for input(s): INR in the last 48 hours.    Significant Procedures:   Dobutamine Stress Test with Color Flow: No results found for this or any previous visit.

## 2022-01-15 NOTE — SIGNIFICANT EVENT
Contacted by nurse regarding patient's concern about not being on an antibiotic after he was told that he was going to be given antibiotics for his hand infection.  Chart reviewed.  Considering the nature of his injury will place pharmacy consult for vancomycin dosing considering that he will need MRSA coverage pending intraoperative culture results should anything result from those.

## 2022-01-15 NOTE — PROGRESS NOTES
[facility] - [facility]  Orthopedics  Progress Note    Patient Name: Diaz Serrano Jr.  MRN: [id number]  Admission Date: 1/14/2022  Hospital Length of Stay: 0 days  Attending Provider: Linden Hinojosa*  Primary Care Provider: Michelle Del Valle MD  Follow-up For: Procedure(s) (LRB):  INCISION AND DRAINAGE, HAND (Right)  TENDOSYNNOVECTOMY (Right)    Post-Operative Day: 1 Day Post-Op  Subjective:     Principal Problem:Flexor tenosynovitis of finger    Principal Orthopedic Problem: same as above    Interval History: s/p I&D 1/14/22 with Dr. Driscoll. Patient seen and examined at bedside. NAEON. Patient doing well. Reports pain well controlled.      Review of patient's allergies indicates:   Allergen Reactions    Pcn [penicillins]        Current Facility-Administered Medications   Medication    ALPRAZolam tablet 0.25 mg    atorvastatin tablet 40 mg    dextrose 50% injection 12.5 g    dextrose 50% injection 25 g    emtricitabine-tenofovir alafen 200-25 mg Tab 1 tablet    And    rilpivirine Tab 25 mg    glucagon (human recombinant) injection 1 mg    glucose chewable tablet 16 g    glucose chewable tablet 24 g    insulin aspart U-100 pen 0-5 Units    lisinopriL-hydrochlorothiazide 10-12.5 mg per tablet 1 tablet    melatonin tablet 6 mg    naloxone 0.4 mg/mL injection 0.02 mg    oxyCODONE immediate release tablet 5 mg    oxyCODONE immediate release tablet Tab 10 mg    sodium chloride 0.9% flush 10 mL    sodium chloride 0.9% flush 10 mL    vancomycin - pharmacy to dose    vancomycin 1.75 g in 5 % dextrose 500 mL IVPB     Objective:     Vital Signs (Most Recent):  Temp: 96.7 °F (35.9 °C) (01/15/22 0529)  Pulse: 79 (01/15/22 0529)  Resp: 18 (01/15/22 0558)  BP: (!) 141/77 (01/15/22 0529)  SpO2: (!) 92 % (01/15/22 0529) Vital Signs (24h Range):  Temp:  [96.6 °F (35.9 °C)-99 °F (37.2 °C)] 96.7 °F (35.9 °C)  Pulse:  [79-95] 79  Resp:  [12-22] 18  SpO2:  [91 %-99 %] 92 %  BP: (122-159)/(61-86) 141/77  "    Weight: 113.4 kg (250 lb)  Height: 5' 9" (175.3 cm)  Body mass index is 36.92 kg/m².    No intake or output data in the 24 hours ending 01/15/22 0629    Ortho/SPM Exam   AAOx4  NAD  Reg rate  No increased WOB    RUE:  Dressing c/d/i  FROM shoulder, elbow, and wrist  SILT M/U/R  Motor intact AIN/PIN/M/U/R  WWP extremities      Significant Labs:   Recent Lab Results       01/15/22  0220   01/14/22  1813   01/14/22  1722        Baso # 0.04           Basophil % 0.3           .0           Differential Method Automated           Eos # 0.0           Eosinophil % 0.0           Gram Stain Result   Rare WBC's            No organisms seen            Rare WBC's            No organisms seen         Gran # (ANC) 11.6           Gran % 88.2           Hematocrit 55.7           Hemoglobin 18.3           Immature Grans (Abs) 0.19  Comment: Mild elevation in immature granulocytes is non specific and   can be seen in a variety of conditions including stress response,   acute inflammation, trauma and pregnancy. Correlation with other   laboratory and clinical findings is essential.             Immature Granulocytes 1.4           Lymph # 0.9           Lymph % 7.1           MCH 29.3           MCHC 32.9           MCV 89           Mono # 0.4           Mono % 3.0           MPV 10.6           nRBC 0           Platelets 295           POCT Glucose     90       RBC 6.24           RDW 15.1           WBC 13.16               All pertinent labs within the past 24 hours have been reviewed.    Significant Imaging: I have reviewed all pertinent imaging results/findings.    Assessment/Plan:     * Flexor tenosynovitis of finger  Diaz Baltazar Zach Bañuelos is a 46 y.o. male with PMH of HIV, prediabetes, HTN presenting with flexor tenosynovitis of the R IF. He is RHD. 4/4 [place] signs positive. Imaging consistent with flexor tenosynovitis and overlying cellulitis. S/p I&D of R index finger with Dr. Driscoll on 1/14/22. Cory weston, " cultures taken.    --Clindamycin postop. Started on vanc  --ID consult for antibiotic recommendations  --FU cultures      --Will continue to monitor exam and take down dressings POD2 to determine whether pt will benefit from repeat I&D          Eben Coates MD  Orthopedics  [facility] - Surgery

## 2022-01-15 NOTE — TRANSFER OF CARE
"Anesthesia Transfer of Care Note    Patient: Diaz Serrano Jr.    Procedure(s) Performed: Procedure(s) (LRB):  INCISION AND DRAINAGE, HAND (Right)  TENDOSYNNOVECTOMY (Right)    Patient location: PACU    Anesthesia Type: general    Transport from OR: Transported from OR on 6-10 L/min O2 by face mask with adequate spontaneous ventilation    Post pain: adequate analgesia    Post assessment: no apparent anesthetic complications    Post vital signs: stable    Nausea/Vomiting: no nausea/vomiting    Complications: none    Transfer of care protocol was followed      Last vitals:   Visit Vitals  /61 (BP Location: Left arm, Patient Position: Lying)   Pulse 90   Temp 36.9 °C (98.4 °F) (Temporal)   Resp 12   Ht 5' 9" (1.753 m)   Wt 113.4 kg (250 lb)   SpO2 99%   BMI 36.92 kg/m²     "

## 2022-01-15 NOTE — SUBJECTIVE & OBJECTIVE
"Principal Problem:Flexor tenosynovitis of finger    Principal Orthopedic Problem: same as above    Interval History: s/p I&D 1/14/22 with Dr. Driscoll. Patient seen and examined at bedside. LLOYD. Patient doing well. Reports pain well controlled.      Review of patient's allergies indicates:   Allergen Reactions    Pcn [penicillins]        Current Facility-Administered Medications   Medication    ALPRAZolam tablet 0.25 mg    atorvastatin tablet 40 mg    dextrose 50% injection 12.5 g    dextrose 50% injection 25 g    emtricitabine-tenofovir alafen 200-25 mg Tab 1 tablet    And    rilpivirine Tab 25 mg    glucagon (human recombinant) injection 1 mg    glucose chewable tablet 16 g    glucose chewable tablet 24 g    insulin aspart U-100 pen 0-5 Units    lisinopriL-hydrochlorothiazide 10-12.5 mg per tablet 1 tablet    melatonin tablet 6 mg    naloxone 0.4 mg/mL injection 0.02 mg    oxyCODONE immediate release tablet 5 mg    oxyCODONE immediate release tablet Tab 10 mg    sodium chloride 0.9% flush 10 mL    sodium chloride 0.9% flush 10 mL    vancomycin - pharmacy to dose    vancomycin 1.75 g in 5 % dextrose 500 mL IVPB     Objective:     Vital Signs (Most Recent):  Temp: 96.7 °F (35.9 °C) (01/15/22 0529)  Pulse: 79 (01/15/22 0529)  Resp: 18 (01/15/22 0558)  BP: (!) 141/77 (01/15/22 0529)  SpO2: (!) 92 % (01/15/22 0529) Vital Signs (24h Range):  Temp:  [96.6 °F (35.9 °C)-99 °F (37.2 °C)] 96.7 °F (35.9 °C)  Pulse:  [79-95] 79  Resp:  [12-22] 18  SpO2:  [91 %-99 %] 92 %  BP: (122-159)/(61-86) 141/77     Weight: 113.4 kg (250 lb)  Height: 5' 9" (175.3 cm)  Body mass index is 36.92 kg/m².    No intake or output data in the 24 hours ending 01/15/22 0629    Ortho/SPM Exam   AAOx4  NAD  Reg rate  No increased WOB    RUE:  Dressing c/d/i  FROM shoulder, elbow, and wrist  SILT M/U/R  Motor intact AIN/PIN/M/U/R  WWP extremities      Significant Labs:   Recent Lab Results       01/15/22  0220   01/14/22  1813   " 01/14/22  1722        Baso # 0.04           Basophil % 0.3           .0           Differential Method Automated           Eos # 0.0           Eosinophil % 0.0           Gram Stain Result   Rare WBC's            No organisms seen            Rare WBC's            No organisms seen         Gran # (ANC) 11.6           Gran % 88.2           Hematocrit 55.7           Hemoglobin 18.3           Immature Grans (Abs) 0.19  Comment: Mild elevation in immature granulocytes is non specific and   can be seen in a variety of conditions including stress response,   acute inflammation, trauma and pregnancy. Correlation with other   laboratory and clinical findings is essential.             Immature Granulocytes 1.4           Lymph # 0.9           Lymph % 7.1           MCH 29.3           MCHC 32.9           MCV 89           Mono # 0.4           Mono % 3.0           MPV 10.6           nRBC 0           Platelets 295           POCT Glucose     90       RBC 6.24           RDW 15.1           WBC 13.16               All pertinent labs within the past 24 hours have been reviewed.    Significant Imaging: I have reviewed all pertinent imaging results/findings.

## 2022-01-15 NOTE — HPI
46-year-old male with HTN, HIV transferred from [facility] for infection of his right index finger.  3 days ago, the patient had noticed sudden onset of pain and swelling of his right index finger after waking.  Was initially given Bactrim however noted spreading of redness from the distal joint of his finger prompting presentation to the ED and subsequent transfer to [facility] [place], undergoing  I&D and tenosynovectomy on 1/14/22, with immediate purulence encountered.  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  Denies any recent noticeable cuts or trauma to the finger, or any particular hobbies that would include exposure to water, plants, or more wildlife.  Does have a dog but does not play bite nor has it bitten the patient recently.    In regards to HIV, was diagnosed 2000's, unknown inciting event.  Has regular follow-up with his HIV/ID specialist in [place].  Previously on Atripla, currently on Odefsey, fully adherent.  Reports his viral load undetectable, CD4 count ~1200, last checked a few months ago.  MSM, monogamous relationship with his , reporting distant history of chlamydia.  Denies IVDU.  Received all of his tattoos in reputable parlors.    In regards to antibiotic allergy history, has received Augmentin and cephalosporins in the past without issue.

## 2022-01-15 NOTE — PROGRESS NOTES
[facility]  [facility] Medicine  Progress Note    Patient Name: Diaz Serrano Jr.  MRN: [id number]  Patient Class: OP- Observation   Admission Date: 1/14/2022  Length of Stay: 0 days  Attending Physician: Linden Hinojosa*  Primary Care Provider: Michelle Del Valle MD        Subjective:     Principal Problem:Flexor tenosynovitis of finger        HPI:  Diaz Serrano Jr. Is a 46 y.o. male with past medical history of HIV, HTN, HLD who presented as transfer from [facility] for Orthopedic hand surgery services. Patient states symptoms started about three days ago, initially with small part of skin breakdown on the distal part of his index finger. Over the next several days he had progressive swelling and redness of the area along with concurrent pain. He was given bactrim but only was able to take one dose before he presented. He was then transferred to [facility] for concern for flexor tenosynovitis. He was admitted to hospital medicine with orthopedic surgery consult.       Overview/Hospital Course:  Patient was admitted to hospital medicine with orthopedic surgery consult. He underwent I&D 1/14/22.       Interval History: Pt feels like pain well controlled.     Review of Systems   Constitutional: Negative for chills and fever.   HENT: Negative for congestion and sore throat.    Respiratory: Negative for cough and shortness of breath.    Cardiovascular: Negative for chest pain and leg swelling.   Gastrointestinal: Negative for abdominal pain, nausea and vomiting.   Genitourinary: Negative for decreased urine volume, dysuria, frequency and urgency.   Musculoskeletal: Positive for arthralgias and joint swelling. Negative for myalgias.   Skin: Negative for color change and pallor.   Neurological: Negative for dizziness, light-headedness and headaches.   Psychiatric/Behavioral: Negative for agitation and confusion.     Objective:     Vital Signs (Most Recent):  Temp: 97.5 °F (36.4 °C) (01/15/22  0750)  Pulse: 77 (01/15/22 0750)  Resp: 18 (01/15/22 0750)  BP: (!) 140/79 (01/15/22 0750)  SpO2: 95 % (01/15/22 0828) Vital Signs (24h Range):  Temp:  [96.6 °F (35.9 °C)-98.6 °F (37 °C)] 97.5 °F (36.4 °C)  Pulse:  [77-95] 77  Resp:  [12-22] 18  SpO2:  [91 %-99 %] 95 %  BP: (122-159)/(61-86) 140/79     Weight: 113.4 kg (250 lb)  Body mass index is 36.92 kg/m².  No intake or output data in the 24 hours ending 01/15/22 1409   Physical Exam  Constitutional:       General: He is not in acute distress.     Appearance: Normal appearance. He is not toxic-appearing or diaphoretic.   Cardiovascular:      Rate and Rhythm: Normal rate and regular rhythm.      Heart sounds: No murmur heard.  No friction rub. No gallop.    Pulmonary:      Effort: Pulmonary effort is normal. No respiratory distress.      Breath sounds: No wheezing or rales.   Abdominal:      General: Abdomen is flat. There is no distension.      Palpations: Abdomen is soft.      Tenderness: There is no abdominal tenderness. There is no guarding or rebound.   Musculoskeletal:      Cervical back: Normal range of motion and neck supple.      Right lower leg: No edema.      Left lower leg: No edema.      Comments: Hand in cast   Skin:     General: Skin is warm and dry.   Neurological:      Mental Status: He is alert.         Computed MELD-Na score unavailable. Necessary lab results were not found in the last year.  Computed MELD score unavailable. Necessary lab results were not found in the last year.    Significant Labs:  CBC:  Recent Labs   Lab 01/15/22  0220   WBC 13.16*   HGB 18.3*   HCT 55.7*        CMP:  No results for input(s): NA, K, CL, CO2, GLU, BUN, CREATININE, CALCIUM, PROT, ALBUMIN, BILITOT, ALKPHOS, AST, ALT, ANIONGAP, EGFRNONAA in the last 48 hours.    Invalid input(s): ESTGFAFRICA  PTINR:  No results for input(s): INR in the last 48 hours.    Significant Procedures:   Dobutamine Stress Test with Color Flow: No results found for this or any  previous visit.        Assessment/Plan:      * Flexor tenosynovitis of finger  MRI with flexor tenosynovitis of the index finer extending to the level of the metacarpal neck  Ortho consulted, s/p I&D 1/14/22. ID consult following surgical intervention for antibiotic recommendations. Start vancomycin, rocephin. Follow operative culture results.     Hyperlipidemia  Continue lipitor      Essential hypertension  Continue hctz - lisinopril      HIV (human immunodeficiency virus infection)  Continue ART - odefsey   Check VL, CD4      VTE Risk Mitigation (From admission, onward)         Ordered     IP VTE HIGH RISK PATIENT  Once         01/14/22 1840     Place sequential compression device  Until discontinued         01/14/22 1840     Place sequential compression device  Until discontinued         01/14/22 1720                Discharge Planning   FELICITA:      Code Status: Full Code   Is the patient medically ready for discharge?:     Reason for patient still in hospital (select all that apply): Patient trending condition and Consult recommendations           Linden Andrea MD  Department of Hospital Medicine   [facility] - Surgery

## 2022-01-15 NOTE — ASSESSMENT & PLAN NOTE
Diaz Baltazar Zach Bañuelos is a 46 y.o. male with PMH of HIV, prediabetes, HTN presenting with flexor tenosynovitis of the R IF. He is RHD. 4/4 Kanavel signs positive. Imaging consistent with flexor tenosynovitis and overlying cellulitis. S/p I&D of R index finger with Dr. Driscoll on 1/14/22. Cory pus intraop, cultures taken.    --Clindamycin postop. Started on vanc  --ID consult for antibiotic recommendations  --FU cultures      --Will continue to monitor exam and take down dressings POD2 to determine whether pt will benefit from repeat I&D

## 2022-01-15 NOTE — ANESTHESIA POSTPROCEDURE EVALUATION
Anesthesia Post Evaluation    Patient: Diaz Serrano Jr.    Procedure(s) Performed: Procedure(s) (LRB):  INCISION AND DRAINAGE, HAND (Right)  TENDOSYNNOVECTOMY (Right)    Final Anesthesia Type: general      Patient location during evaluation: PACU  Patient participation: Yes- Able to Participate  Level of consciousness: awake and alert and oriented  Post-procedure vital signs: reviewed and stable  Pain management: adequate  Airway patency: patent    PONV status at discharge: No PONV  Anesthetic complications: no      Cardiovascular status: blood pressure returned to baseline  Respiratory status: unassisted, room air and spontaneous ventilation  Hydration status: euvolemic  Follow-up not needed.          Vitals Value Taken Time   /61 01/14/22 1848   Temp 36.9 °C (98.4 °F) 01/14/22 1846   Pulse 90 01/14/22 1856   Resp 18 01/14/22 1856   SpO2 98 % 01/14/22 1856   Vitals shown include unvalidated device data.      No case tracking events are documented in the log.      Pain/Asiya Score: Pain Rating Prior to Med Admin: 6 (1/14/2022  2:20 PM)

## 2022-01-16 LAB
BACTERIA SPEC AEROBE CULT: ABNORMAL
BASOPHILS # BLD AUTO: 0.07 K/UL (ref 0–0.2)
BASOPHILS NFR BLD: 0.6 % (ref 0–1.9)
CREAT SERPL-MCNC: 1.5 MG/DL (ref 0.5–1.4)
CRP SERPL-MCNC: 75.5 MG/L (ref 0–8.2)
DIFFERENTIAL METHOD: ABNORMAL
EOSINOPHIL # BLD AUTO: 0.1 K/UL (ref 0–0.5)
EOSINOPHIL NFR BLD: 0.9 % (ref 0–8)
ERYTHROCYTE [DISTWIDTH] IN BLOOD BY AUTOMATED COUNT: 15.5 % (ref 11.5–14.5)
EST. GFR  (AFRICAN AMERICAN): >60 ML/MIN/1.73 M^2
EST. GFR  (NON AFRICAN AMERICAN): 55.8 ML/MIN/1.73 M^2
HCT VFR BLD AUTO: 56.9 % (ref 40–54)
HGB BLD-MCNC: 18.9 G/DL (ref 14–18)
IMM GRANULOCYTES # BLD AUTO: 0.15 K/UL (ref 0–0.04)
IMM GRANULOCYTES NFR BLD AUTO: 1.3 % (ref 0–0.5)
LYMPHOCYTES # BLD AUTO: 1.9 K/UL (ref 1–4.8)
LYMPHOCYTES NFR BLD: 16.1 % (ref 18–48)
MCH RBC QN AUTO: 30.1 PG (ref 27–31)
MCHC RBC AUTO-ENTMCNC: 33.2 G/DL (ref 32–36)
MCV RBC AUTO: 91 FL (ref 82–98)
MONOCYTES # BLD AUTO: 0.9 K/UL (ref 0.3–1)
MONOCYTES NFR BLD: 7.9 % (ref 4–15)
NEUTROPHILS # BLD AUTO: 8.7 K/UL (ref 1.8–7.7)
NEUTROPHILS NFR BLD: 73.2 % (ref 38–73)
NRBC BLD-RTO: 0 /100 WBC
PLATELET # BLD AUTO: 284 K/UL (ref 150–450)
PMV BLD AUTO: 10.6 FL (ref 9.2–12.9)
POCT GLUCOSE: 113 MG/DL (ref 70–110)
POCT GLUCOSE: 130 MG/DL (ref 70–110)
POCT GLUCOSE: 87 MG/DL (ref 70–110)
POCT GLUCOSE: 92 MG/DL (ref 70–110)
RBC # BLD AUTO: 6.28 M/UL (ref 4.6–6.2)
VANCOMYCIN TROUGH SERPL-MCNC: 8.7 UG/ML (ref 10–22)
WBC # BLD AUTO: 11.81 K/UL (ref 3.9–12.7)

## 2022-01-16 PROCEDURE — 85025 COMPLETE CBC W/AUTO DIFF WBC: CPT | Performed by: STUDENT IN AN ORGANIZED HEALTH CARE EDUCATION/TRAINING PROGRAM

## 2022-01-16 PROCEDURE — 80202 ASSAY OF VANCOMYCIN: CPT | Performed by: STUDENT IN AN ORGANIZED HEALTH CARE EDUCATION/TRAINING PROGRAM

## 2022-01-16 PROCEDURE — 82565 ASSAY OF CREATININE: CPT | Performed by: STUDENT IN AN ORGANIZED HEALTH CARE EDUCATION/TRAINING PROGRAM

## 2022-01-16 PROCEDURE — 25000003 PHARM REV CODE 250: Performed by: STUDENT IN AN ORGANIZED HEALTH CARE EDUCATION/TRAINING PROGRAM

## 2022-01-16 PROCEDURE — 99214 OFFICE O/P EST MOD 30 MIN: CPT | Mod: ,,, | Performed by: STUDENT IN AN ORGANIZED HEALTH CARE EDUCATION/TRAINING PROGRAM

## 2022-01-16 PROCEDURE — 36415 COLL VENOUS BLD VENIPUNCTURE: CPT | Performed by: STUDENT IN AN ORGANIZED HEALTH CARE EDUCATION/TRAINING PROGRAM

## 2022-01-16 PROCEDURE — 99226 PR SUBSEQUENT OBSERVATION CARE,LEVEL III: ICD-10-PCS | Mod: ,,, | Performed by: STUDENT IN AN ORGANIZED HEALTH CARE EDUCATION/TRAINING PROGRAM

## 2022-01-16 PROCEDURE — 86361 T CELL ABSOLUTE COUNT: CPT | Performed by: STUDENT IN AN ORGANIZED HEALTH CARE EDUCATION/TRAINING PROGRAM

## 2022-01-16 PROCEDURE — 63600175 PHARM REV CODE 636 W HCPCS: Performed by: STUDENT IN AN ORGANIZED HEALTH CARE EDUCATION/TRAINING PROGRAM

## 2022-01-16 PROCEDURE — 86140 C-REACTIVE PROTEIN: CPT | Performed by: STUDENT IN AN ORGANIZED HEALTH CARE EDUCATION/TRAINING PROGRAM

## 2022-01-16 PROCEDURE — 99214 PR OFFICE/OUTPT VISIT, EST, LEVL IV, 30-39 MIN: ICD-10-PCS | Mod: ,,, | Performed by: STUDENT IN AN ORGANIZED HEALTH CARE EDUCATION/TRAINING PROGRAM

## 2022-01-16 PROCEDURE — 87536 HIV-1 QUANT&REVRSE TRNSCRPJ: CPT | Performed by: STUDENT IN AN ORGANIZED HEALTH CARE EDUCATION/TRAINING PROGRAM

## 2022-01-16 PROCEDURE — G0378 HOSPITAL OBSERVATION PER HR: HCPCS

## 2022-01-16 PROCEDURE — 99226 PR SUBSEQUENT OBSERVATION CARE,LEVEL III: CPT | Mod: ,,, | Performed by: STUDENT IN AN ORGANIZED HEALTH CARE EDUCATION/TRAINING PROGRAM

## 2022-01-16 RX ADMIN — VANCOMYCIN HYDROCHLORIDE 1750 MG: 500 INJECTION, POWDER, LYOPHILIZED, FOR SOLUTION INTRAVENOUS at 11:01

## 2022-01-16 RX ADMIN — CEFTRIAXONE 2 G: 2 INJECTION, SOLUTION INTRAVENOUS at 02:01

## 2022-01-16 RX ADMIN — LISINOPRIL AND HYDROCHLOROTHIAZIDE 1 TABLET: 12.5; 1 TABLET ORAL at 08:01

## 2022-01-16 RX ADMIN — OXYCODONE HYDROCHLORIDE 10 MG: 10 TABLET ORAL at 03:01

## 2022-01-16 RX ADMIN — VANCOMYCIN HYDROCHLORIDE 2000 MG: 500 INJECTION, POWDER, LYOPHILIZED, FOR SOLUTION INTRAVENOUS at 12:01

## 2022-01-16 RX ADMIN — OXYCODONE HYDROCHLORIDE 10 MG: 10 TABLET ORAL at 05:01

## 2022-01-16 RX ADMIN — OXYCODONE 5 MG: 5 TABLET ORAL at 10:01

## 2022-01-16 RX ADMIN — EMTRICITABINE AND TENOFOVIR ALAFENAMIDE 1 TABLET: 200; 25 TABLET ORAL at 08:01

## 2022-01-16 RX ADMIN — ATORVASTATIN CALCIUM 40 MG: 10 TABLET, FILM COATED ORAL at 08:01

## 2022-01-16 RX ADMIN — RILPIVIRINE HYDROCHLORIDE 25 MG: 25 TABLET, FILM COATED ORAL at 08:01

## 2022-01-16 NOTE — ASSESSMENT & PLAN NOTE
Flexor tenosynovitis of right index finger s/p I&D and tenosynovectomy 1/14/22.  No apparent trauma, no distinctive exposures or risk factors to suspect less typical pathogens.  OR cx in process - so far unrevealing.     Recommendations:  -Vancomycin dosing per pharmacy  -Ceftriaxone 2g IV q24h  -f/u cx data

## 2022-01-16 NOTE — SUBJECTIVE & OBJECTIVE
Interval History: Pt has no new complaints. Pain controlled. Afebrile. Culture growing strep dysgalactiae    Review of Systems   Constitutional: Negative for chills and fever.   Respiratory: Negative for cough and shortness of breath.    Cardiovascular: Negative for chest pain and leg swelling.   Gastrointestinal: Negative for abdominal pain, nausea and vomiting.   Genitourinary: Negative for dysuria.   Musculoskeletal: Negative for myalgias.   Skin: Positive for wound. Negative for color change and pallor.   Neurological: Negative for light-headedness and headaches.     Objective:     Vital Signs (Most Recent):  Temp: 98.2 °F (36.8 °C) (01/16/22 1106)  Pulse: 86 (01/16/22 1106)  Resp: 20 (01/16/22 1106)  BP: 139/74 (01/16/22 1106)  SpO2: 96 % (01/16/22 1106) Vital Signs (24h Range):  Temp:  [96.9 °F (36.1 °C)-98.5 °F (36.9 °C)] 98.2 °F (36.8 °C)  Pulse:  [] 86  Resp:  [16-20] 20  SpO2:  [93 %-96 %] 96 %  BP: (139-153)/(74-97) 139/74     Weight: 113.4 kg (250 lb)  Body mass index is 36.92 kg/m².    Intake/Output Summary (Last 24 hours) at 1/16/2022 1431  Last data filed at 1/15/2022 2200  Gross per 24 hour   Intake 250 ml   Output --   Net 250 ml      Physical Exam  Constitutional:       General: He is not in acute distress.     Appearance: Normal appearance.   Cardiovascular:      Rate and Rhythm: Normal rate and regular rhythm.      Heart sounds: No murmur heard.  No friction rub. No gallop.    Pulmonary:      Effort: Pulmonary effort is normal. No respiratory distress.   Abdominal:      General: Abdomen is flat. There is no distension.      Palpations: Abdomen is soft.      Tenderness: There is no abdominal tenderness.   Musculoskeletal:      Cervical back: Normal range of motion and neck supple.      Right lower leg: No edema.      Left lower leg: No edema.      Comments: Hand in cast   Skin:     General: Skin is warm and dry.   Neurological:      Mental Status: He is alert.         Computed MELD-Na score  unavailable. Necessary lab results were not found in the last year.  Computed MELD score unavailable. Necessary lab results were not found in the last year.    Significant Labs:  CBC:  Recent Labs   Lab 01/15/22  0220 01/16/22  0416   WBC 13.16* 11.81   HGB 18.3* 18.9*   HCT 55.7* 56.9*    284     CMP:  Recent Labs   Lab 01/16/22  1229   CREATININE 1.5*   EGFRNONAA 55.8*     PTINR:  No results for input(s): INR in the last 48 hours.    Significant Procedures:   Dobutamine Stress Test with Color Flow: No results found for this or any previous visit.

## 2022-01-16 NOTE — ASSESSMENT & PLAN NOTE
Diaz Baltazar Zach Bañuelos is a 46 y.o. male with PMH of HIV, prediabetes, HTN presenting with flexor tenosynovitis of the R IF. He is RHD. 4/4 Kanavel signs positive. Imaging consistent with flexor tenosynovitis and overlying cellulitis. S/p I&D of R index finger with Dr. Driscoll on 1/14/22. Cory pus intraop, cultures taken.    --Cultures NGTD  --Vanc/rocephin per ID      --Exam improved, no purulence when dressings removed. Will discuss with staff regarding need for repeat I&D

## 2022-01-16 NOTE — PROGRESS NOTES
Pharmacokinetic Assessment Follow Up: IV Vancomycin    Vancomycin serum concentration assessment(s):    The trough level was drawn correctly and can be used to guide therapy at this time. The measurement is below the desired definitive target range of 15 to 20 mcg/mL.  No CMP for past couples days, so asked team and will order daily BMP.    Vancomycin Regimen Plan:    Change regimen to Vancomycin 2000 mg IV every 12 hours with next serum trough concentration measured at 2330 prior to 4th dose on 1/17/22.  Consider drawing vancomycin level sooner if patient's renal function worsens.    ADDENDUM:  - Scr resulted at 1.5 mg/dL today. Was 1.2 mg/dL on 1/13 however, baseline Scr ~1.4 mg/dL from chart review. Due to concern for accumulation and renal function, will change back to 1750 mg IV Q12H. Patient has already gotten 2000 mg x 1 dose. Will closely monitor renal function and if Scr continues to rise, will obtain vancomycin level sooner. Next serum trough concentration measured at 2330 prior to 4th dose on 1/17/22.    Drug levels (last 3 results):  Recent Labs   Lab Result Units 01/16/22  1039   Vancomycin-Trough ug/mL 8.7*       Pharmacy will continue to follow and monitor vancomycin.    Please contact pharmacy at extension [phone] for questions regarding this assessment.    Thank you for the consult,   Vandana Barkley       Patient brief summary:  Diaz Baltazar Zach Honeycutt. is a 46 y.o. male initiated on antimicrobial therapy with IV Vancomycin for treatment of skin & soft tissue infection    The patient's current regimen is 1750 mg IV Q12H    Drug Allergies:   Review of patient's allergies indicates:   Allergen Reactions    Pcn [penicillins]        Actual Body Weight:   113.4 kg    Renal Function:   Estimated Creatinine Clearance: 97.6 mL/min (based on SCr of 1.2 mg/dL).,     Dialysis Method (if applicable):  N/A    CBC (last 72 hours):  Recent Labs   Lab Result Units 01/15/22  0220 01/16/22  0416   WBC K/uL  13.16* 11.81   Hemoglobin g/dL 18.3* 18.9*   Hematocrit % 55.7* 56.9*   Platelets K/uL 295 284   Gran % % 88.2* 73.2*   Lymph % % 7.1* 16.1*   Mono % % 3.0* 7.9   Eosinophil % % 0.0 0.9   Basophil % % 0.3 0.6   Differential Method  Automated Automated       Metabolic Panel (last 72 hours):  No results for input(s): SODIUM, POTASSIUM, CHLORIDE, CO2, GLUCOSE, BUN BLD, CREATININE, ALBUMIN, BILIRUBIN TOTAL, ALK PHOS, AST, ALT, MAGNESIUM, PHOSPHORUS in the last 72 hours.    Vancomycin Administrations:  vancomycin given in the last 96 hours                   vancomycin 1.75 g in 5 % dextrose 500 mL IVPB (mg) 1,750 mg New Bag 01/15/22 2257     1,750 mg New Bag  1119     1,750 mg New Bag 01/14/22 2329    vancomycin (VANCOCIN) 1,750 mg in dextrose 5 % 500 mL IVPB (mg) 1,750 mg New Bag 01/13/22 0927                Microbiologic Results:  Microbiology Results (last 7 days)     Procedure Component Value Units Date/Time    AFB Culture & Smear [[id number]] Collected: 01/14/22 1813    Order Status: Completed Specimen: Incision site from Finger, Right Hand Updated: 01/15/22 2127     AFB Culture & Smear Culture in progress    Narrative:      Right index finger 1    AFB Culture & Smear [[id number]] Collected: 01/14/22 1813    Order Status: Completed Specimen: Incision site from Finger, Right Hand Updated: 01/15/22 2127     AFB Culture & Smear Culture in progress    Narrative:      Right index finger 2    Aerobic culture [[id number]] Collected: 01/14/22 1813    Order Status: Completed Specimen: Incision site from Finger, Right Hand Updated: 01/15/22 1304     Aerobic Bacterial Culture Insufficient incubation, culture in progress    Narrative:      Right index finger 1    Aerobic culture [[id number]] Collected: 01/14/22 1813    Order Status: Completed Specimen: Incision site from Finger, Right Hand Updated: 01/15/22 0731     Aerobic Bacterial Culture No growth    Narrative:      Right index finger 2    Gram stain [[id number]] Collected:  01/14/22 1813    Order Status: Completed Specimen: Incision site from Finger, Right Hand Updated: 01/14/22 2138     Gram Stain Result Rare WBC's      No organisms seen    Narrative:      Right index finger 1    Gram stain [[id number]] Collected: 01/14/22 1813    Order Status: Completed Specimen: Incision site from Finger, Right Hand Updated: 01/14/22 2135     Gram Stain Result Rare WBC's      No organisms seen    Narrative:      Right index finger 2    Culture, Anaerobe [[id number]] Collected: 01/14/22 1813    Order Status: Sent Specimen: Incision site from Finger, Right Hand Updated: 01/14/22 1845    Fungus culture [[id number]] Collected: 01/14/22 1813    Order Status: Sent Specimen: Incision site from Finger, Right Hand Updated: 01/14/22 1832    Fungus culture [[id number]] Collected: 01/14/22 1813    Order Status: Sent Specimen: Incision site from Finger, Right Hand Updated: 01/14/22 1831    Culture, Anaerobe [[id number]] Collected: 01/14/22 1813    Order Status: Sent Specimen: Incision site from Finger, Right Hand Updated: 01/14/22 1831

## 2022-01-16 NOTE — PROGRESS NOTES
[facility]  Orthopedics  Progress Note    Patient Name: Diaz Serrano Jr.  MRN: [id number]  Admission Date: 1/14/2022  Hospital Length of Stay: 0 days  Attending Provider: Linden Hinojosa*  Primary Care Provider: Michelle Del Valle MD  Follow-up For: Procedure(s) (LRB):  INCISION AND DRAINAGE, HAND (Right)  TENDOSYNNOVECTOMY (Right)    Post-Operative Day: 2 Days Post-Op  Subjective:     Principal Problem:Flexor tenosynovitis of finger    Principal Orthopedic Problem: same as above    Interval History: s/p I&D 1/14/22 with Dr. Driscoll. Patient seen and examined at bedside. NAEON. Dressing taken down, no signs of purulence. Pt reports pain is well controlled and motor function of fingers is much improved.      Review of patient's allergies indicates:   Allergen Reactions    Pcn [penicillins]        Current Facility-Administered Medications   Medication    ALPRAZolam tablet 0.25 mg    atorvastatin tablet 40 mg    cefTRIAXone (ROCEPHIN) 1 g/50 mL D5W IVPB    dextrose 50% injection 12.5 g    dextrose 50% injection 25 g    emtricitabine-tenofovir alafen 200-25 mg Tab 1 tablet    And    rilpivirine Tab 25 mg    glucagon (human recombinant) injection 1 mg    glucose chewable tablet 16 g    glucose chewable tablet 24 g    insulin aspart U-100 pen 0-5 Units    lisinopriL-hydrochlorothiazide 10-12.5 mg per tablet 1 tablet    melatonin tablet 6 mg    naloxone 0.4 mg/mL injection 0.02 mg    oxyCODONE immediate release tablet 5 mg    oxyCODONE immediate release tablet Tab 10 mg    sodium chloride 0.9% flush 10 mL    sodium chloride 0.9% flush 10 mL    vancomycin - pharmacy to dose    vancomycin 1.75 g in 5 % dextrose 500 mL IVPB     Objective:     Vital Signs (Most Recent):  Temp: 96.9 °F (36.1 °C) (01/16/22 0539)  Pulse: 69 (01/16/22 0539)  Resp: 16 (01/16/22 0539)  BP: (!) 153/97 (01/16/22 0539)  SpO2: (!) 94 % (01/16/22 0539) Vital Signs (24h Range):  Temp:  [96.9 °F (36.1 °C)-98.5 °F  "(36.9 °C)] 96.9 °F (36.1 °C)  Pulse:  [] 69  Resp:  [16-20] 16  SpO2:  [93 %-97 %] 94 %  BP: (140-153)/(79-97) 153/97     Weight: 113.4 kg (250 lb)  Height: 5' 9" (175.3 cm)  Body mass index is 36.92 kg/m².      Intake/Output Summary (Last 24 hours) at 1/16/2022 0716  Last data filed at 1/15/2022 2200  Gross per 24 hour   Intake 250 ml   Output --   Net 250 ml       Ortho/SPM Exam     AAOx4  NAD  Reg rate  No increased WOB    RUE:  Dressing c/d/i  FROM shoulder, elbow, and wrist  SILT M/U/R  Motor intact AIN/PIN/M/U/R  WWP extremities      Significant Labs:   Recent Lab Results       01/16/22  0541   01/16/22  0416   01/16/22  0053   01/15/22  1835        Baso #   0.07           Basophil %   0.6           CRP   75.5           Differential Method   Automated           Eos #   0.1           Eosinophil %   0.9           Gran # (ANC)   8.7           Gran %   73.2           Hematocrit   56.9           Hemoglobin   18.9           Immature Grans (Abs)   0.15  Comment: Mild elevation in immature granulocytes is non specific and   can be seen in a variety of conditions including stress response,   acute inflammation, trauma and pregnancy. Correlation with other   laboratory and clinical findings is essential.             Immature Granulocytes   1.3           Lymph #   1.9           Lymph %   16.1           MCH   30.1           MCHC   33.2           MCV   91           Mono #   0.9           Mono %   7.9           MPV   10.6           nRBC   0           Platelets   284           POCT Glucose 87     130   136       RBC   6.28           RDW   15.5           WBC   11.81               All pertinent labs within the past 24 hours have been reviewed.    Significant Imaging: I have reviewed all pertinent imaging results/findings.    Assessment/Plan:     * Flexor tenosynovitis of finger  Diaz Baltazar Zach Bañuelos is a 46 y.o. male with PMH of HIV, prediabetes, HTN presenting with flexor tenosynovitis of the R IF. He is RHD. " 4/4 Kanavel signs positive. Imaging consistent with flexor tenosynovitis and overlying cellulitis. S/p I&D of R index finger with Dr. Driscoll on 1/14/22. Cory pus intraop, cultures taken.    --Cultures NGTD  --Vanc/rocephin per ID      --Exam improved, no purulence when dressings removed. Will discuss with staff regarding need for repeat I&D          Eben Coates MD  Orthopedics  [facility] - Surgery

## 2022-01-16 NOTE — SUBJECTIVE & OBJECTIVE
Interval History: No fevers documented overnight. Tolerating abx without issues. S/p dressing change this morning, no further evidence of pus per patient.       Review of Systems   Constitutional: Negative for chills and fever.   All other systems reviewed and are negative.    Objective:     Vital Signs (Most Recent):  Temp: 96.9 °F (36.1 °C) (01/16/22 0539)  Pulse: 69 (01/16/22 0539)  Resp: 16 (01/16/22 0539)  BP: (!) 153/97 (01/16/22 0539)  SpO2: (!) 94 % (01/16/22 0539) Vital Signs (24h Range):  Temp:  [96.9 °F (36.1 °C)-98.5 °F (36.9 °C)] 96.9 °F (36.1 °C)  Pulse:  [] 69  Resp:  [16-20] 16  SpO2:  [93 %-97 %] 94 %  BP: (140-153)/(79-97) 153/97     Weight: 113.4 kg (250 lb)  Body mass index is 36.92 kg/m².    Estimated Creatinine Clearance: 97.6 mL/min (based on SCr of 1.2 mg/dL).    Physical Exam  Constitutional:       General: He is not in acute distress.     Appearance: He is not ill-appearing, toxic-appearing or diaphoretic.   HENT:      Head: Normocephalic and atraumatic.      Right Ear: External ear normal.      Left Ear: External ear normal.      Nose: Nose normal.      Mouth/Throat:      Mouth: Mucous membranes are moist.      Pharynx: No oropharyngeal exudate or posterior oropharyngeal erythema.   Eyes:      General: No scleral icterus.        Right eye: No discharge.         Left eye: No discharge.   Pulmonary:      Effort: Pulmonary effort is normal. No respiratory distress.      Breath sounds: No stridor. No wheezing or rhonchi.   Musculoskeletal:      Right lower leg: No edema.      Left lower leg: No edema.   Skin:     General: Skin is warm and dry.      Findings: No erythema or rash.      Comments: R hand wrapped  Tattoos   Neurological:      General: No focal deficit present.      Mental Status: He is alert and oriented to person, place, and time.      Motor: No weakness.   Psychiatric:         Mood and Affect: Mood normal.         Thought Content: Thought content normal.         Judgment:  Judgment normal.         Significant Labs:   Microbiology Results (last 7 days)     Procedure Component Value Units Date/Time    AFB Culture & Smear [[id number]] Collected: 01/14/22 1813    Order Status: Completed Specimen: Incision site from Finger, Right Hand Updated: 01/15/22 2127     AFB Culture & Smear Culture in progress    Narrative:      Right index finger 1    AFB Culture & Smear [[id number]] Collected: 01/14/22 1813    Order Status: Completed Specimen: Incision site from Finger, Right Hand Updated: 01/15/22 2127     AFB Culture & Smear Culture in progress    Narrative:      Right index finger 2    Aerobic culture [[id number]] Collected: 01/14/22 1813    Order Status: Completed Specimen: Incision site from Finger, Right Hand Updated: 01/15/22 1304     Aerobic Bacterial Culture Insufficient incubation, culture in progress    Narrative:      Right index finger 1    Aerobic culture [[id number]] Collected: 01/14/22 1813    Order Status: Completed Specimen: Incision site from Finger, Right Hand Updated: 01/15/22 0731     Aerobic Bacterial Culture No growth    Narrative:      Right index finger 2    Gram stain [[id number]] Collected: 01/14/22 1813    Order Status: Completed Specimen: Incision site from Finger, Right Hand Updated: 01/14/22 2138     Gram Stain Result Rare WBC's      No organisms seen    Narrative:      Right index finger 1    Gram stain [[id number]] Collected: 01/14/22 1813    Order Status: Completed Specimen: Incision site from Finger, Right Hand Updated: 01/14/22 2135     Gram Stain Result Rare WBC's      No organisms seen    Narrative:      Right index finger 2    Culture, Anaerobe [[id number]] Collected: 01/14/22 1813    Order Status: Sent Specimen: Incision site from Finger, Right Hand Updated: 01/14/22 1845    Fungus culture [[id number]] Collected: 01/14/22 1813    Order Status: Sent Specimen: Incision site from Finger, Right Hand Updated: 01/14/22 1832    Fungus culture [[id number]] Collected:  01/14/22 1813    Order Status: Sent Specimen: Incision site from Finger, Right Hand Updated: 01/14/22 1831    Culture, Anaerobe [[id number]] Collected: 01/14/22 1813    Order Status: Sent Specimen: Incision site from Finger, Right Hand Updated: 01/14/22 1831          Significant Imaging: I have reviewed all pertinent imaging results/findings within the past 24 hours.

## 2022-01-16 NOTE — PROGRESS NOTES
[facility] - Surgery  [facility] Medicine  Progress Note    Patient Name: Diaz Serrano Jr.  MRN: [id number]  Patient Class: OP- Observation   Admission Date: 1/14/2022  Length of Stay: 0 days  Attending Physician: Linden Hinojosa*  Primary Care Provider: Michelle Del Valle MD        Subjective:     Principal Problem:Flexor tenosynovitis of finger        HPI:  Diaz Serrano Jr. Is a 46 y.o. male with past medical history of HIV, HTN, HLD who presented as transfer from [facility] for Orthopedic hand surgery services. Patient states symptoms started about three days ago, initially with small part of skin breakdown on the distal part of his index finger. Over the next several days he had progressive swelling and redness of the area along with concurrent pain. He was given bactrim but only was able to take one dose before he presented. He was then transferred to [facility] for concern for flexor tenosynovitis. He was admitted to hospital medicine with orthopedic surgery consult.       Overview/Hospital Course:  Patient was admitted to hospital medicine with orthopedic surgery consult. He underwent I&D 1/14/22. He was started on vancomycin and rocephin.       Interval History: Pt has no new complaints. Pain controlled. Afebrile. Culture growing strep dysgalactiae    Review of Systems   Constitutional: Negative for chills and fever.   Respiratory: Negative for cough and shortness of breath.    Cardiovascular: Negative for chest pain and leg swelling.   Gastrointestinal: Negative for abdominal pain, nausea and vomiting.   Genitourinary: Negative for dysuria.   Musculoskeletal: Negative for myalgias.   Skin: Positive for wound. Negative for color change and pallor.   Neurological: Negative for light-headedness and headaches.     Objective:     Vital Signs (Most Recent):  Temp: 98.2 °F (36.8 °C) (01/16/22 1106)  Pulse: 86 (01/16/22 1106)  Resp: 20 (01/16/22 1106)  BP: 139/74 (01/16/22 1106)  SpO2: 96 % (01/16/22  1106) Vital Signs (24h Range):  Temp:  [96.9 °F (36.1 °C)-98.5 °F (36.9 °C)] 98.2 °F (36.8 °C)  Pulse:  [] 86  Resp:  [16-20] 20  SpO2:  [93 %-96 %] 96 %  BP: (139-153)/(74-97) 139/74     Weight: 113.4 kg (250 lb)  Body mass index is 36.92 kg/m².    Intake/Output Summary (Last 24 hours) at 1/16/2022 1431  Last data filed at 1/15/2022 2200  Gross per 24 hour   Intake 250 ml   Output --   Net 250 ml      Physical Exam  Constitutional:       General: He is not in acute distress.     Appearance: Normal appearance.   Cardiovascular:      Rate and Rhythm: Normal rate and regular rhythm.      Heart sounds: No murmur heard.  No friction rub. No gallop.    Pulmonary:      Effort: Pulmonary effort is normal. No respiratory distress.   Abdominal:      General: Abdomen is flat. There is no distension.      Palpations: Abdomen is soft.      Tenderness: There is no abdominal tenderness.   Musculoskeletal:      Cervical back: Normal range of motion and neck supple.      Right lower leg: No edema.      Left lower leg: No edema.      Comments: Hand in cast   Skin:     General: Skin is warm and dry.   Neurological:      Mental Status: He is alert.         Computed MELD-Na score unavailable. Necessary lab results were not found in the last year.  Computed MELD score unavailable. Necessary lab results were not found in the last year.    Significant Labs:  CBC:  Recent Labs   Lab 01/15/22  0220 01/16/22  0416   WBC 13.16* 11.81   HGB 18.3* 18.9*   HCT 55.7* 56.9*    284     CMP:  Recent Labs   Lab 01/16/22  1229   CREATININE 1.5*   EGFRNONAA 55.8*     PTINR:  No results for input(s): INR in the last 48 hours.    Significant Procedures:   Dobutamine Stress Test with Color Flow: No results found for this or any previous visit.        Assessment/Plan:      * Flexor tenosynovitis of finger  MRI with flexor tenosynovitis of the index finer extending to the level of the metacarpal neck  Ortho consulted, s/p I&D 1/14/22. ID  consult following surgical intervention for antibiotic recommendations. Start vancomycin, rocephin. Follow operative culture results.     Hyperlipidemia  Continue lipitor      Essential hypertension  Continue hctz - lisinopril      HIV (human immunodeficiency virus infection)  Continue ART - odefsey   Check VL, CD4      VTE Risk Mitigation (From admission, onward)         Ordered     IP VTE HIGH RISK PATIENT  Once         01/14/22 1840     Place sequential compression device  Until discontinued         01/14/22 1840     Place sequential compression device  Until discontinued         01/14/22 1720                Discharge Planning   FELICITA: 1/17/2022     Code Status: Full Code   Is the patient medically ready for discharge?: No    Reason for patient still in hospital (select all that apply): Patient trending condition and Consult recommendations           Linden Andrea MD  Department of Hospital Medicine   [facility] - Surgery

## 2022-01-16 NOTE — ASSESSMENT & PLAN NOTE
On Odefsey, appears well-maintained and reportedly immunocompetent with CD4 1200's a few months ago.  Followed by ID provider Dr. Frank? - reports good adherence to ARV and denies OI.     Recommendations:  -Continue Tenofovir, emtricitabine, and rilpivirine (Odefsey)  -Check viral load, CD4 count  -No OI ppx indicated currently

## 2022-01-16 NOTE — SUBJECTIVE & OBJECTIVE
"Principal Problem:Flexor tenosynovitis of finger    Principal Orthopedic Problem: same as above    Interval History: s/p I&D 1/14/22 with Dr. Driscoll. Patient seen and examined at bedside. NAEON. Dressing taken down, no signs of purulence. Pt reports pain is well controlled and motor function of fingers is much improved.      Review of patient's allergies indicates:   Allergen Reactions    Pcn [penicillins]        Current Facility-Administered Medications   Medication    ALPRAZolam tablet 0.25 mg    atorvastatin tablet 40 mg    cefTRIAXone (ROCEPHIN) 1 g/50 mL D5W IVPB    dextrose 50% injection 12.5 g    dextrose 50% injection 25 g    emtricitabine-tenofovir alafen 200-25 mg Tab 1 tablet    And    rilpivirine Tab 25 mg    glucagon (human recombinant) injection 1 mg    glucose chewable tablet 16 g    glucose chewable tablet 24 g    insulin aspart U-100 pen 0-5 Units    lisinopriL-hydrochlorothiazide 10-12.5 mg per tablet 1 tablet    melatonin tablet 6 mg    naloxone 0.4 mg/mL injection 0.02 mg    oxyCODONE immediate release tablet 5 mg    oxyCODONE immediate release tablet Tab 10 mg    sodium chloride 0.9% flush 10 mL    sodium chloride 0.9% flush 10 mL    vancomycin - pharmacy to dose    vancomycin 1.75 g in 5 % dextrose 500 mL IVPB     Objective:     Vital Signs (Most Recent):  Temp: 96.9 °F (36.1 °C) (01/16/22 0539)  Pulse: 69 (01/16/22 0539)  Resp: 16 (01/16/22 0539)  BP: (!) 153/97 (01/16/22 0539)  SpO2: (!) 94 % (01/16/22 0539) Vital Signs (24h Range):  Temp:  [96.9 °F (36.1 °C)-98.5 °F (36.9 °C)] 96.9 °F (36.1 °C)  Pulse:  [] 69  Resp:  [16-20] 16  SpO2:  [93 %-97 %] 94 %  BP: (140-153)/(79-97) 153/97     Weight: 113.4 kg (250 lb)  Height: 5' 9" (175.3 cm)  Body mass index is 36.92 kg/m².      Intake/Output Summary (Last 24 hours) at 1/16/2022 0716  Last data filed at 1/15/2022 2200  Gross per 24 hour   Intake 250 ml   Output --   Net 250 ml       Ortho/SPM Exam     AAOx4  NAD  Reg " rate  No increased WOB    RUE:  Dressing c/d/i  FROM shoulder, elbow, and wrist  SILT M/U/R  Motor intact AIN/PIN/M/U/R  WWP extremities      Significant Labs:   Recent Lab Results       01/16/22  0541   01/16/22  0416   01/16/22  0053   01/15/22  1835        Baso #   0.07           Basophil %   0.6           CRP   75.5           Differential Method   Automated           Eos #   0.1           Eosinophil %   0.9           Gran # (ANC)   8.7           Gran %   73.2           Hematocrit   56.9           Hemoglobin   18.9           Immature Grans (Abs)   0.15  Comment: Mild elevation in immature granulocytes is non specific and   can be seen in a variety of conditions including stress response,   acute inflammation, trauma and pregnancy. Correlation with other   laboratory and clinical findings is essential.             Immature Granulocytes   1.3           Lymph #   1.9           Lymph %   16.1           MCH   30.1           MCHC   33.2           MCV   91           Mono #   0.9           Mono %   7.9           MPV   10.6           nRBC   0           Platelets   284           POCT Glucose 87     130   136       RBC   6.28           RDW   15.5           WBC   11.81               All pertinent labs within the past 24 hours have been reviewed.    Significant Imaging: I have reviewed all pertinent imaging results/findings.

## 2022-01-16 NOTE — PROGRESS NOTES
[facility] - Surgery  Infectious Disease  Progress Note    Patient Name: Diaz Serrano Jr.  MRN: [id number]  Admission Date: 1/14/2022  Length of Stay: 0 days  Attending Physician: Linden Hinojosa*  Primary Care Provider: Michelle Del Valle MD    Isolation Status: No active isolations  Assessment/Plan:      * Flexor tenosynovitis of finger  Flexor tenosynovitis of right index finger s/p I&D and tenosynovectomy 1/14/22.  No apparent trauma, no distinctive exposures or risk factors to suspect less typical pathogens.  OR cx in process - so far unrevealing.     Recommendations:  -Vancomycin dosing per pharmacy  -Ceftriaxone 2g IV q24h  -f/u cx data    HIV (human immunodeficiency virus infection)  On Odefsey, appears well-maintained and reportedly immunocompetent with CD4 1200's a few months ago.  Followed by ID provider Dr. Frank? - reports good adherence to ARV and denies OI.     Recommendations:  -Continue Tenofovir, emtricitabine, and rilpivirine (Odefsey)  -Check viral load, CD4 count  -No OI ppx indicated currently            Thank you for your consult. I will follow-up with patient. Please contact us if you have any additional questions. Above d/w primary team.     Nayeli Santiago MD  Infectious Disease  [facility] - Surgery    Subjective:     Principal Problem:Flexor tenosynovitis of finger    HPI: 46-year-old male with HTN, HIV transferred from [facility] for infection of his right index finger.  3 days ago, the patient had noticed sudden onset of pain and swelling of his right index finger after waking.  Was initially given Bactrim however noted spreading of redness from the distal joint of his finger prompting presentation to the ED and subsequent transfer to [facility], undergoing  I&D and tenosynovectomy on 1/14/22, with immediate purulence encountered.  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  Denies any recent noticeable cuts or trauma to the finger, or any particular hobbies that would include exposure to  water, plants, or more wildlife.  Does have a dog but does not play bite nor has it bitten the patient recently.    In regards to HIV, was diagnosed 2000's, unknown inciting event.  Has regular follow-up with his HIV/ID specialist in [place].  Previously on Atripla, currently on Odefsey, fully adherent.  Reports his viral load undetectable, CD4 count ~1200, last checked a few months ago.  MSM, monogamous relationship with his , reporting distant history of chlamydia.  Denies IVDU.  Received all of his tattoos in reputable parlors.    In regards to antibiotic allergy history, has received Augmentin and cephalosporins in the past without issue.    Interval History: No fevers documented overnight. Tolerating abx without issues. S/p dressing change this morning, no further evidence of pus per patient.       Review of Systems   Constitutional: Negative for chills and fever.   All other systems reviewed and are negative.    Objective:     Vital Signs (Most Recent):  Temp: 96.9 °F (36.1 °C) (01/16/22 0539)  Pulse: 69 (01/16/22 0539)  Resp: 16 (01/16/22 0539)  BP: (!) 153/97 (01/16/22 0539)  SpO2: (!) 94 % (01/16/22 0539) Vital Signs (24h Range):  Temp:  [96.9 °F (36.1 °C)-98.5 °F (36.9 °C)] 96.9 °F (36.1 °C)  Pulse:  [] 69  Resp:  [16-20] 16  SpO2:  [93 %-97 %] 94 %  BP: (140-153)/(79-97) 153/97     Weight: 113.4 kg (250 lb)  Body mass index is 36.92 kg/m².    Estimated Creatinine Clearance: 97.6 mL/min (based on SCr of 1.2 mg/dL).    Physical Exam  Constitutional:       General: He is not in acute distress.     Appearance: He is not ill-appearing, toxic-appearing or diaphoretic.   HENT:      Head: Normocephalic and atraumatic.      Right Ear: External ear normal.      Left Ear: External ear normal.      Nose: Nose normal.      Mouth/Throat:      Mouth: Mucous membranes are moist.      Pharynx: No oropharyngeal exudate or posterior oropharyngeal erythema.   Eyes:      General: No scleral icterus.         Right eye: No discharge.         Left eye: No discharge.   Pulmonary:      Effort: Pulmonary effort is normal. No respiratory distress.      Breath sounds: No stridor. No wheezing or rhonchi.   Musculoskeletal:      Right lower leg: No edema.      Left lower leg: No edema.   Skin:     General: Skin is warm and dry.      Findings: No erythema or rash.      Comments: R hand wrapped  Tattoos   Neurological:      General: No focal deficit present.      Mental Status: He is alert and oriented to person, place, and time.      Motor: No weakness.   Psychiatric:         Mood and Affect: Mood normal.         Thought Content: Thought content normal.         Judgment: Judgment normal.         Significant Labs:   Microbiology Results (last 7 days)     Procedure Component Value Units Date/Time    AFB Culture & Smear [[id number]] Collected: 01/14/22 1813    Order Status: Completed Specimen: Incision site from Finger, Right Hand Updated: 01/15/22 2127     AFB Culture & Smear Culture in progress    Narrative:      Right index finger 1    AFB Culture & Smear [[id number]] Collected: 01/14/22 1813    Order Status: Completed Specimen: Incision site from Finger, Right Hand Updated: 01/15/22 2127     AFB Culture & Smear Culture in progress    Narrative:      Right index finger 2    Aerobic culture [[id number]] Collected: 01/14/22 1813    Order Status: Completed Specimen: Incision site from Finger, Right Hand Updated: 01/15/22 1304     Aerobic Bacterial Culture Insufficient incubation, culture in progress    Narrative:      Right index finger 1    Aerobic culture [[id number]] Collected: 01/14/22 1813    Order Status: Completed Specimen: Incision site from Finger, Right Hand Updated: 01/15/22 0731     Aerobic Bacterial Culture No growth    Narrative:      Right index finger 2    Gram stain [[id number]] Collected: 01/14/22 1813    Order Status: Completed Specimen: Incision site from Finger, Right Hand Updated: 01/14/22 2138     Gram Stain Result  Rare WBC's      No organisms seen    Narrative:      Right index finger 1    Gram stain [[id number]] Collected: 01/14/22 1813    Order Status: Completed Specimen: Incision site from Finger, Right Hand Updated: 01/14/22 2135     Gram Stain Result Rare WBC's      No organisms seen    Narrative:      Right index finger 2    Culture, Anaerobe [[id number]] Collected: 01/14/22 1813    Order Status: Sent Specimen: Incision site from Finger, Right Hand Updated: 01/14/22 1845    Fungus culture [[id number]] Collected: 01/14/22 1813    Order Status: Sent Specimen: Incision site from Finger, Right Hand Updated: 01/14/22 1832    Fungus culture [[id number]] Collected: 01/14/22 1813    Order Status: Sent Specimen: Incision site from Finger, Right Hand Updated: 01/14/22 1831    Culture, Anaerobe [[id number]] Collected: 01/14/22 1813    Order Status: Sent Specimen: Incision site from Finger, Right Hand Updated: 01/14/22 1831          Significant Imaging: I have reviewed all pertinent imaging results/findings within the past 24 hours.     “You can access the FollowHealth Patient Portal, offered by [facility], by registering with the following website: [email or web address]”

## 2022-01-17 VITALS
BODY MASS INDEX: 37.03 KG/M2 | SYSTOLIC BLOOD PRESSURE: 130 MMHG | HEART RATE: 92 BPM | HEIGHT: 69 IN | DIASTOLIC BLOOD PRESSURE: 74 MMHG | RESPIRATION RATE: 18 BRPM | WEIGHT: 250 LBS | TEMPERATURE: 97 F | OXYGEN SATURATION: 92 %

## 2022-01-17 LAB
ANION GAP SERPL CALC-SCNC: 9 MMOL/L (ref 8–16)
BASOPHILS # BLD AUTO: 0.11 K/UL (ref 0–0.2)
BASOPHILS NFR BLD: 1.1 % (ref 0–1.9)
BUN SERPL-MCNC: 22 MG/DL (ref 6–20)
CALCIUM SERPL-MCNC: 9.7 MG/DL (ref 8.7–10.5)
CHLORIDE SERPL-SCNC: 102 MMOL/L (ref 95–110)
CO2 SERPL-SCNC: 22 MMOL/L (ref 23–29)
CREAT SERPL-MCNC: 1.2 MG/DL (ref 0.5–1.4)
CRP SERPL-MCNC: 32.6 MG/L (ref 0–8.2)
DIFFERENTIAL METHOD: ABNORMAL
EOSINOPHIL # BLD AUTO: 0.2 K/UL (ref 0–0.5)
EOSINOPHIL NFR BLD: 1.8 % (ref 0–8)
ERYTHROCYTE [DISTWIDTH] IN BLOOD BY AUTOMATED COUNT: 15.7 % (ref 11.5–14.5)
ERYTHROCYTE [SEDIMENTATION RATE] IN BLOOD BY WESTERGREN METHOD: 63 MM/HR (ref 0–23)
EST. GFR  (AFRICAN AMERICAN): >60 ML/MIN/1.73 M^2
EST. GFR  (NON AFRICAN AMERICAN): >60 ML/MIN/1.73 M^2
GLUCOSE SERPL-MCNC: 98 MG/DL (ref 70–110)
HCT VFR BLD AUTO: 59.8 % (ref 40–54)
HGB BLD-MCNC: 19.9 G/DL (ref 14–18)
IMM GRANULOCYTES # BLD AUTO: 0.31 K/UL (ref 0–0.04)
IMM GRANULOCYTES NFR BLD AUTO: 3.2 % (ref 0–0.5)
LYMPHOCYTES # BLD AUTO: 2.3 K/UL (ref 1–4.8)
LYMPHOCYTES NFR BLD: 23.8 % (ref 18–48)
MCH RBC QN AUTO: 29.1 PG (ref 27–31)
MCHC RBC AUTO-ENTMCNC: 33.3 G/DL (ref 32–36)
MCV RBC AUTO: 87 FL (ref 82–98)
MONOCYTES # BLD AUTO: 1 K/UL (ref 0.3–1)
MONOCYTES NFR BLD: 10.7 % (ref 4–15)
NEUTROPHILS # BLD AUTO: 5.7 K/UL (ref 1.8–7.7)
NEUTROPHILS NFR BLD: 59.4 % (ref 38–73)
NRBC BLD-RTO: 0 /100 WBC
PLATELET # BLD AUTO: 305 K/UL (ref 150–450)
PMV BLD AUTO: 10.5 FL (ref 9.2–12.9)
POCT GLUCOSE: 54 MG/DL (ref 70–110)
POCT GLUCOSE: 91 MG/DL (ref 70–110)
POCT GLUCOSE: 99 MG/DL (ref 70–110)
POTASSIUM SERPL-SCNC: 4.9 MMOL/L (ref 3.5–5.1)
RBC # BLD AUTO: 6.84 M/UL (ref 4.6–6.2)
SODIUM SERPL-SCNC: 133 MMOL/L (ref 136–145)
WBC # BLD AUTO: 9.57 K/UL (ref 3.9–12.7)

## 2022-01-17 PROCEDURE — 36415 COLL VENOUS BLD VENIPUNCTURE: CPT

## 2022-01-17 PROCEDURE — 63600175 PHARM REV CODE 636 W HCPCS: Performed by: STUDENT IN AN ORGANIZED HEALTH CARE EDUCATION/TRAINING PROGRAM

## 2022-01-17 PROCEDURE — G0378 HOSPITAL OBSERVATION PER HR: HCPCS

## 2022-01-17 PROCEDURE — 99214 OFFICE O/P EST MOD 30 MIN: CPT | Mod: ,,, | Performed by: INTERNAL MEDICINE

## 2022-01-17 PROCEDURE — 85025 COMPLETE CBC W/AUTO DIFF WBC: CPT | Performed by: STUDENT IN AN ORGANIZED HEALTH CARE EDUCATION/TRAINING PROGRAM

## 2022-01-17 PROCEDURE — 99214 PR OFFICE/OUTPT VISIT, EST, LEVL IV, 30-39 MIN: ICD-10-PCS | Mod: ,,, | Performed by: INTERNAL MEDICINE

## 2022-01-17 PROCEDURE — 25000003 PHARM REV CODE 250: Performed by: STUDENT IN AN ORGANIZED HEALTH CARE EDUCATION/TRAINING PROGRAM

## 2022-01-17 PROCEDURE — 80048 BASIC METABOLIC PNL TOTAL CA: CPT | Performed by: STUDENT IN AN ORGANIZED HEALTH CARE EDUCATION/TRAINING PROGRAM

## 2022-01-17 PROCEDURE — 86140 C-REACTIVE PROTEIN: CPT

## 2022-01-17 PROCEDURE — 85652 RBC SED RATE AUTOMATED: CPT

## 2022-01-17 PROCEDURE — 99217 PR OBSERVATION CARE DISCHARGE: CPT | Mod: ,,, | Performed by: STUDENT IN AN ORGANIZED HEALTH CARE EDUCATION/TRAINING PROGRAM

## 2022-01-17 PROCEDURE — 36415 COLL VENOUS BLD VENIPUNCTURE: CPT | Performed by: STUDENT IN AN ORGANIZED HEALTH CARE EDUCATION/TRAINING PROGRAM

## 2022-01-17 PROCEDURE — 99217 PR OBSERVATION CARE DISCHARGE: ICD-10-PCS | Mod: ,,, | Performed by: STUDENT IN AN ORGANIZED HEALTH CARE EDUCATION/TRAINING PROGRAM

## 2022-01-17 RX ORDER — OXYCODONE HYDROCHLORIDE 5 MG/1
5 TABLET ORAL EVERY 6 HOURS PRN
Qty: 12 TABLET | Refills: 0 | Status: SHIPPED | OUTPATIENT
Start: 2022-01-17 | End: 2022-01-20

## 2022-01-17 RX ORDER — CEFADROXIL 1000 MG/1
1 TABLET ORAL 2 TIMES DAILY
Qty: 20 TABLET | Refills: 0 | Status: SHIPPED | OUTPATIENT
Start: 2022-01-17 | End: 2022-01-27

## 2022-01-17 RX ADMIN — OXYCODONE 5 MG: 5 TABLET ORAL at 06:01

## 2022-01-17 RX ADMIN — ATORVASTATIN CALCIUM 40 MG: 10 TABLET, FILM COATED ORAL at 09:01

## 2022-01-17 RX ADMIN — VANCOMYCIN HYDROCHLORIDE 1750 MG: 500 INJECTION, POWDER, LYOPHILIZED, FOR SOLUTION INTRAVENOUS at 12:01

## 2022-01-17 RX ADMIN — RILPIVIRINE HYDROCHLORIDE 25 MG: 25 TABLET, FILM COATED ORAL at 09:01

## 2022-01-17 RX ADMIN — EMTRICITABINE AND TENOFOVIR ALAFENAMIDE 1 TABLET: 200; 25 TABLET ORAL at 09:01

## 2022-01-17 RX ADMIN — OXYCODONE HYDROCHLORIDE 10 MG: 10 TABLET ORAL at 12:01

## 2022-01-17 RX ADMIN — LISINOPRIL AND HYDROCHLOROTHIAZIDE 1 TABLET: 12.5; 1 TABLET ORAL at 09:01

## 2022-01-17 NOTE — NURSING
Patient ambulatory, resting in bed.  PIV intact to l wrist.  Tolerating PO intake.  Dressing to R hand c/d/i.  Remained free of falls this shift.  Pain control with oral meds.  Plan of care reviewed with patient.  Will continue to monitor

## 2022-01-17 NOTE — ASSESSMENT & PLAN NOTE
Diaz Serrano  is a 46 y.o. male with PMH of HIV, prediabetes, HTN presenting with flexor tenosynovitis of the R IF. He is RHD. 4/4 [place] signs positive. Imaging consistent with flexor tenosynovitis and overlying cellulitis. S/p I&D of R index finger with Dr. Driscoll on 1/14/22. Cory pus intraop, cultures taken.    --Cultures growing strep dysgalactiae  --Continue abx  --Appreciate ID recs      --Exam improved, no purulence when dressings removed      --Further care per ID/HM. Ortho will plan to see pt for 1 week follow up for wound check

## 2022-01-17 NOTE — ASSESSMENT & PLAN NOTE
Flexor tenosynovitis of right index finger s/p I&D and tenosynovectomy 1/14/22. Cx growing strep dysgalactiae, commonly sensitive to beta-lactams. Imaging not showing suspicion for osteo.     Recommendations:  - Cefadroxil PO 1g twice daily for 10 more days. F/u with ID who follows him for his HIV.   - Given listed PCN allergy recommend f/u with allergy immunology for confirmation of allergy

## 2022-01-17 NOTE — NURSING
" Patient received bedside medication  prescriptions and copy of discharged papers instructions. Pt denies pain at this time. Pt educated on signs and symptoms of when to call the doctor. All belongings with the patient . Pt verbalized understanding.   Patient received bandaids for his wound . Patient stated, " ride not coming until 4-5 pm   "

## 2022-01-17 NOTE — SUBJECTIVE & OBJECTIVE
"Principal Problem:Flexor tenosynovitis of finger    Principal Orthopedic Problem: same as above    Interval History: s/p I&D 1/14/22 with Dr. Driscoll. Patient seen and examined at bedside. LLOYD. Dressing taken down again today, no signs of purulence. Pt reports pain is well controlled and motor function of fingers continues to improve.      Review of patient's allergies indicates:   Allergen Reactions    Pcn [penicillins]        Current Facility-Administered Medications   Medication    ALPRAZolam tablet 0.25 mg    atorvastatin tablet 40 mg    cefTRIAXone (ROCEPHIN) 2 g/50 mL D5W IVPB    dextrose 50% injection 12.5 g    dextrose 50% injection 25 g    emtricitabine-tenofovir alafen 200-25 mg Tab 1 tablet    And    rilpivirine Tab 25 mg    glucagon (human recombinant) injection 1 mg    glucose chewable tablet 16 g    glucose chewable tablet 24 g    insulin aspart U-100 pen 0-5 Units    lisinopriL-hydrochlorothiazide 10-12.5 mg per tablet 1 tablet    melatonin tablet 6 mg    naloxone 0.4 mg/mL injection 0.02 mg    oxyCODONE immediate release tablet 5 mg    oxyCODONE immediate release tablet Tab 10 mg    sodium chloride 0.9% flush 10 mL    sodium chloride 0.9% flush 10 mL    vancomycin - pharmacy to dose    vancomycin 1.75 g in 5 % dextrose 500 mL IVPB     Objective:     Vital Signs (Most Recent):  Temp: 96.4 °F (35.8 °C) (01/17/22 0443)  Pulse: 74 (01/17/22 0443)  Resp: 16 (01/17/22 0443)  BP: (!) 144/91 (01/17/22 0443)  SpO2: (!) 93 % (01/17/22 0443) Vital Signs (24h Range):  Temp:  [96.4 °F (35.8 °C)-98.2 °F (36.8 °C)] 96.4 °F (35.8 °C)  Pulse:  [74-86] 74  Resp:  [16-20] 16  SpO2:  [91 %-96 %] 93 %  BP: (136-147)/(74-91) 144/91     Weight: 113.4 kg (250 lb)  Height: 5' 9" (175.3 cm)  Body mass index is 36.92 kg/m².      Intake/Output Summary (Last 24 hours) at 1/17/2022 0652  Last data filed at 1/16/2022 1534  Gross per 24 hour   Intake --   Output 5 ml   Net -5 ml       Ortho/SPM Exam   "   AAOx4  NAD  Reg rate  No increased WOB    RUE:  Dressing c/d/i  Unable to express purulence from surgical incisions  Mild TTP over R IF  FROM shoulder, elbow, and wrist  SILT M/U/R  Motor intact AIN/PIN/M/U/R  WWP extremities      Significant Labs:   Recent Lab Results       01/17/22  0601   01/17/22  0501   01/17/22  0227   01/17/22  0226   01/16/22  2240        Anion Gap     9           Baso #       0.11         Basophil %       1.1         BUN     22           Calcium     9.7           Chloride     102           CO2     22           Creatinine     1.2           Differential Method       Automated         eGFR if      >60.0           eGFR if non      >60.0  Comment: Calculation used to obtain the estimated glomerular filtration  rate (eGFR) is the CKD-EPI equation.              Eos #       0.2         Eosinophil %       1.8         Glucose     98           Gran # (ANC)       5.7         Gran %       59.4         Hematocrit       59.8         Hemoglobin       19.9         Immature Grans (Abs)       0.31  Comment: Mild elevation in immature granulocytes is non specific and   can be seen in a variety of conditions including stress response,   acute inflammation, trauma and pregnancy. Correlation with other   laboratory and clinical findings is essential.           Immature Granulocytes       3.2         Lymph #       2.3         Lymph %       23.8         MCH       29.1         MCHC       33.3         MCV       87         Mono #       1.0         Mono %       10.7         MPV       10.5         nRBC       0         Platelets       305         POCT Glucose 99   54       92       Potassium     4.9           RBC       6.84         RDW       15.7         Sodium     133           Vancomycin-Trough               WBC       9.57                          01/16/22  1652   01/16/22  1229   01/16/22  1039        Anion Gap           Baso #           Basophil %           BUN           Calcium            Chloride           CO2           Creatinine   1.5         Differential Method           eGFR if    >60.0         eGFR if non    55.8  Comment: Calculation used to obtain the estimated glomerular filtration  rate (eGFR) is the CKD-EPI equation.            Eos #           Eosinophil %           Glucose           Gran # (ANC)           Gran %           Hematocrit           Hemoglobin           Immature Grans (Abs)           Immature Granulocytes           Lymph #           Lymph %           MCH           MCHC           MCV           Mono #           Mono %           MPV           nRBC           Platelets           POCT Glucose 113           Potassium           RBC           RDW           Sodium           Vancomycin-Trough     8.7       WBC               All pertinent labs within the past 24 hours have been reviewed.    Significant Imaging: I have reviewed all pertinent imaging results/findings.

## 2022-01-17 NOTE — PROGRESS NOTES
[facility]  Infectious Disease  Progress Note    Patient Name: Diaz Serrano Jr.  MRN: [id number]  Admission Date: 1/14/2022  Length of Stay: 0 days  Attending Physician: Linden Hinojosa*  Primary Care Provider: Michelle Del Valle MD    Isolation Status: No active isolations  Assessment/Plan:      * Flexor tenosynovitis of finger  Flexor tenosynovitis of right index finger s/p I&D and tenosynovectomy 1/14/22. Cx growing strep dysgalactiae, commonly sensitive to beta-lactams. Imaging not showing suspicion for osteo.     Recommendations:  - Cefadroxil PO 1g twice daily for 10 more days. F/u with ID who follows him for his HIV.   - Given listed PCN allergy recommend f/u with allergy immunology for confirmation of allergy    HIV (human immunodeficiency virus infection)  On Odefsey, reported recent CD4 ~ 1200, states adherence to anti retroviral therapy.  Followed by ID provider Dr. Renner in [place]    Recommendations:  -Continue Tenofovir, emtricitabine, and rilpivirine (Odefsey)  -Viral load CD4 pending  -No OI ppx indicated currently      Anticipated Disposition: Discharge on PO cefadroxil     Thank you for your consult. I will sign off. Please contact us if you have any additional questions.    Girish Riggins, DO  Infectious Disease  [facility]    Subjective:     Principal Problem:Flexor tenosynovitis of finger    HPI: 46-year-old male with HTN, HIV transferred [facility] for infection of his right index finger.  3 days ago, the patient had noticed sudden onset of pain and swelling of his right index finger after waking.  Was initially given Bactrim however noted spreading of redness from the distal joint of his finger prompting presentation to the ED and subsequent [facility], undergoing  I&D and tenosynovectomy on 1/14/22, with immediate purulence encountered.  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  Denies any recent noticeable cuts or trauma to the finger, or any particular  hobbies that would include exposure to water, plants, or more wildlife.  Does have a dog but does not play bite nor has it bitten the patient recently.    In regards to HIV, was diagnosed 2000's, unknown inciting event.  Has regular follow-up with his HIV/ID specialist in [place].  Previously on Atripla, currently on Odefsey, fully adherent.  Reports his viral load undetectable, CD4 count ~1200, last checked a few months ago.  MSM, monogamous relationship with his , reporting distant history of chlamydia.  Denies IVDU.  Received all of his tattoos in reputable parlors.    In regards to antibiotic allergy history, has received Augmentin and cephalosporins in the past without issue.    Interval History: No fevers overnight. Patient with no issues, states finger is improving. Expressed he is ready to go home.     Review of Systems   Constitutional: Negative for chills and fever.   Respiratory: Negative for shortness of breath.    Cardiovascular: Negative for chest pain.   Gastrointestinal: Negative for abdominal pain, diarrhea and nausea.   Genitourinary: Negative for dysuria.   Musculoskeletal: Positive for joint swelling. Negative for arthralgias.   Skin: Positive for wound.   All other systems reviewed and are negative.    Objective:     Vital Signs (Most Recent):  Temp: 98.1 °F (36.7 °C) (01/17/22 0923)  Pulse: 90 (01/17/22 0923)  Resp: 18 (01/17/22 0923)  BP: (!) 145/87 (01/17/22 0923)  SpO2: (!) 94 % (01/17/22 0923) Vital Signs (24h Range):  Temp:  [96.4 °F (35.8 °C)-98.2 °F (36.8 °C)] 98.1 °F (36.7 °C)  Pulse:  [74-90] 90  Resp:  [16-20] 18  SpO2:  [91 %-96 %] 94 %  BP: (136-147)/(74-91) 145/87     Weight: 113.4 kg (250 lb)  Body mass index is 36.92 kg/m².    Estimated Creatinine Clearance: 97.6 mL/min (based on SCr of 1.2 mg/dL).    Physical Exam  Constitutional:       General: He is not in acute distress.     Appearance: Normal appearance. He is not ill-appearing, toxic-appearing or diaphoretic.    HENT:      Head: Normocephalic and atraumatic.      Nose: Nose normal.   Eyes:      General: No scleral icterus.        Right eye: No discharge.         Left eye: No discharge.   Cardiovascular:      Rate and Rhythm: Normal rate and regular rhythm.      Heart sounds: No murmur heard.      Pulmonary:      Effort: Pulmonary effort is normal. No respiratory distress.      Breath sounds: No stridor. No wheezing or rhonchi.   Abdominal:      General: There is no distension.      Palpations: Abdomen is soft.      Tenderness: There is no abdominal tenderness.   Musculoskeletal:      Right lower leg: No edema.      Left lower leg: No edema.   Skin:     General: Skin is warm and dry.      Findings: No erythema or rash.      Comments: R finger bandaged  Tattoos   Neurological:      General: No focal deficit present.      Mental Status: He is alert and oriented to person, place, and time.      Motor: No weakness.   Psychiatric:         Mood and Affect: Mood normal.         Thought Content: Thought content normal.         Judgment: Judgment normal.         Significant Labs:   CBC:   Recent Labs   Lab 01/16/22  0416 01/17/22  0226   WBC 11.81 9.57   HGB 18.9* 19.9*   HCT 56.9* 59.8*    305       Significant Imaging: I have reviewed all pertinent imaging results/findings within the past 24 hours.

## 2022-01-17 NOTE — ASSESSMENT & PLAN NOTE
On Odefsigifredo, reported recent CD4 ~ 1200, states adherence to anti retroviral therapy.  Followed by ID provider Dr. Renner in [place]    Recommendations:  -Continue Tenofovir, emtricitabine, and rilpivirine (Odefsey)  -Viral load CD4 pending  -No OI ppx indicated currently

## 2022-01-17 NOTE — SUBJECTIVE & OBJECTIVE
Interval History: No fevers overnight. Patient with no issues, states finger is improving. Expressed he is ready to go home.     Review of Systems   Constitutional: Negative for chills and fever.   Respiratory: Negative for shortness of breath.    Cardiovascular: Negative for chest pain.   Gastrointestinal: Negative for abdominal pain, diarrhea and nausea.   Genitourinary: Negative for dysuria.   Musculoskeletal: Positive for joint swelling. Negative for arthralgias.   Skin: Positive for wound.   All other systems reviewed and are negative.    Objective:     Vital Signs (Most Recent):  Temp: 98.1 °F (36.7 °C) (01/17/22 0923)  Pulse: 90 (01/17/22 0923)  Resp: 18 (01/17/22 0923)  BP: (!) 145/87 (01/17/22 0923)  SpO2: (!) 94 % (01/17/22 0923) Vital Signs (24h Range):  Temp:  [96.4 °F (35.8 °C)-98.2 °F (36.8 °C)] 98.1 °F (36.7 °C)  Pulse:  [74-90] 90  Resp:  [16-20] 18  SpO2:  [91 %-96 %] 94 %  BP: (136-147)/(74-91) 145/87     Weight: 113.4 kg (250 lb)  Body mass index is 36.92 kg/m².    Estimated Creatinine Clearance: 97.6 mL/min (based on SCr of 1.2 mg/dL).    Physical Exam  Constitutional:       General: He is not in acute distress.     Appearance: Normal appearance. He is not ill-appearing, toxic-appearing or diaphoretic.   HENT:      Head: Normocephalic and atraumatic.      Nose: Nose normal.   Eyes:      General: No scleral icterus.        Right eye: No discharge.         Left eye: No discharge.   Cardiovascular:      Rate and Rhythm: Normal rate and regular rhythm.      Heart sounds: No murmur heard.      Pulmonary:      Effort: Pulmonary effort is normal. No respiratory distress.      Breath sounds: No stridor. No wheezing or rhonchi.   Abdominal:      General: There is no distension.      Palpations: Abdomen is soft.      Tenderness: There is no abdominal tenderness.   Musculoskeletal:      Right lower leg: No edema.      Left lower leg: No edema.   Skin:     General: Skin is warm and dry.      Findings: No  erythema or rash.      Comments: R finger bandaged  Tattoos   Neurological:      General: No focal deficit present.      Mental Status: He is alert and oriented to person, place, and time.      Motor: No weakness.   Psychiatric:         Mood and Affect: Mood normal.         Thought Content: Thought content normal.         Judgment: Judgment normal.         Significant Labs:   CBC:   Recent Labs   Lab 01/16/22  0416 01/17/22  0226   WBC 11.81 9.57   HGB 18.9* 19.9*   HCT 56.9* 59.8*    305       Significant Imaging: I have reviewed all pertinent imaging results/findings within the past 24 hours.

## 2022-01-17 NOTE — PROGRESS NOTES
[facility]  Orthopedics  Progress Note    Patient Name: Diaz Serrano Jr.  MRN: [id number]  Admission Date: 1/14/2022  Hospital Length of Stay: 0 days  Attending Provider: Linden Hinojosa*  Primary Care Provider: Michelle Del Valle MD  Follow-up For: Procedure(s) (LRB):  INCISION AND DRAINAGE, HAND (Right)  TENDOSYNNOVECTOMY (Right)    Post-Operative Day: 3 Days Post-Op  Subjective:     Principal Problem:Flexor tenosynovitis of finger    Principal Orthopedic Problem: same as above    Interval History: s/p I&D 1/14/22 with Dr. Driscoll. Patient seen and examined at bedside. NAEON. Dressing taken down again today, no signs of purulence. Pt reports pain is well controlled and motor function of fingers continues to improve.      Review of patient's allergies indicates:   Allergen Reactions    Pcn [penicillins]        Current Facility-Administered Medications   Medication    ALPRAZolam tablet 0.25 mg    atorvastatin tablet 40 mg    cefTRIAXone (ROCEPHIN) 2 g/50 mL D5W IVPB    dextrose 50% injection 12.5 g    dextrose 50% injection 25 g    emtricitabine-tenofovir alafen 200-25 mg Tab 1 tablet    And    rilpivirine Tab 25 mg    glucagon (human recombinant) injection 1 mg    glucose chewable tablet 16 g    glucose chewable tablet 24 g    insulin aspart U-100 pen 0-5 Units    lisinopriL-hydrochlorothiazide 10-12.5 mg per tablet 1 tablet    melatonin tablet 6 mg    naloxone 0.4 mg/mL injection 0.02 mg    oxyCODONE immediate release tablet 5 mg    oxyCODONE immediate release tablet Tab 10 mg    sodium chloride 0.9% flush 10 mL    sodium chloride 0.9% flush 10 mL    vancomycin - pharmacy to dose    vancomycin 1.75 g in 5 % dextrose 500 mL IVPB     Objective:     Vital Signs (Most Recent):  Temp: 96.4 °F (35.8 °C) (01/17/22 0443)  Pulse: 74 (01/17/22 0443)  Resp: 16 (01/17/22 0443)  BP: (!) 144/91 (01/17/22 0443)  SpO2: (!) 93 % (01/17/22 0443) Vital Signs (24h Range):  Temp:  [96.4 °F  "(35.8 °C)-98.2 °F (36.8 °C)] 96.4 °F (35.8 °C)  Pulse:  [74-86] 74  Resp:  [16-20] 16  SpO2:  [91 %-96 %] 93 %  BP: (136-147)/(74-91) 144/91     Weight: 113.4 kg (250 lb)  Height: 5' 9" (175.3 cm)  Body mass index is 36.92 kg/m².      Intake/Output Summary (Last 24 hours) at 1/17/2022 0652  Last data filed at 1/16/2022 1534  Gross per 24 hour   Intake --   Output 5 ml   Net -5 ml       Ortho/SPM Exam     AAOx4  NAD  Reg rate  No increased WOB    RUE:  Dressing c/d/i  Unable to express purulence from surgical incisions  Mild TTP over R IF  FROM shoulder, elbow, and wrist  SILT M/U/R  Motor intact AIN/PIN/M/U/R  WWP extremities      Significant Labs:   Recent Lab Results       01/17/22  0601   01/17/22  0501   01/17/22  0227   01/17/22  0226   01/16/22  2240        Anion Gap     9           Baso #       0.11         Basophil %       1.1         BUN     22           Calcium     9.7           Chloride     102           CO2     22           Creatinine     1.2           Differential Method       Automated         eGFR if      >60.0           eGFR if non      >60.0  Comment: Calculation used to obtain the estimated glomerular filtration  rate (eGFR) is the CKD-EPI equation.              Eos #       0.2         Eosinophil %       1.8         Glucose     98           Gran # (ANC)       5.7         Gran %       59.4         Hematocrit       59.8         Hemoglobin       19.9         Immature Grans (Abs)       0.31  Comment: Mild elevation in immature granulocytes is non specific and   can be seen in a variety of conditions including stress response,   acute inflammation, trauma and pregnancy. Correlation with other   laboratory and clinical findings is essential.           Immature Granulocytes       3.2         Lymph #       2.3         Lymph %       23.8         MCH       29.1         MCHC       33.3         MCV       87         Mono #       1.0         Mono %       10.7         MPV       " 10.5         nRBC       0         Platelets       305         POCT Glucose 99   54       92       Potassium     4.9           RBC       6.84         RDW       15.7         Sodium     133           Vancomycin-Trough               WBC       9.57                          01/16/22  1652   01/16/22  1229   01/16/22  1039        Anion Gap           Baso #           Basophil %           BUN           Calcium           Chloride           CO2           Creatinine   1.5         Differential Method           eGFR if    >60.0         eGFR if non    55.8  Comment: Calculation used to obtain the estimated glomerular filtration  rate (eGFR) is the CKD-EPI equation.            Eos #           Eosinophil %           Glucose           Gran # (ANC)           Gran %           Hematocrit           Hemoglobin           Immature Grans (Abs)           Immature Granulocytes           Lymph #           Lymph %           MCH           MCHC           MCV           Mono #           Mono %           MPV           nRBC           Platelets           POCT Glucose 113           Potassium           RBC           RDW           Sodium           Vancomycin-Trough     8.7       WBC               All pertinent labs within the past 24 hours have been reviewed.    Significant Imaging: I have reviewed all pertinent imaging results/findings.    Assessment/Plan:     * Flexor tenosynovitis of finger  Diaz Serrano Jr. is a 46 y.o. male with PMH of HIV, prediabetes, HTN presenting with flexor tenosynovitis of the R IF. He is RHD. 4/4 [place] signs positive. Imaging consistent with flexor tenosynovitis and overlying cellulitis. S/p I&D of R index finger with Dr. Driscoll on 1/14/22. Cory pus intraop, cultures taken.    --Cultures growing strep dysgalactiae  --Continue abx  --Appreciate ID recs      --Exam improved, no purulence when dressings removed      --Further care per ID/HM. Ortho will plan to see pt for 1 week  follow up for wound check          Eben Coates MD  Orthopedics  [facility]

## 2022-01-18 ENCOUNTER — PATIENT MESSAGE (OUTPATIENT)
Dept: ORTHOPEDICS | Facility: CLINIC | Age: 47
End: 2022-01-18
Payer: COMMERCIAL

## 2022-01-18 LAB
BACTERIA SPEC AEROBE CULT: ABNORMAL
BACTERIA SPEC AEROBE CULT: ABNORMAL
BACTERIA SPEC ANAEROBE CULT: NORMAL
BACTERIA SPEC ANAEROBE CULT: NORMAL
CD3+CD4+ CELLS # BLD: 841 CELLS/UL (ref 300–1400)
CD3+CD4+ CELLS NFR BLD: 45 % (ref 28–57)

## 2022-01-19 LAB — HIV1 RNA # PLAS NAA DL=20: NORMAL COPIES/ML

## 2022-01-21 LAB
FINAL PATHOLOGIC DIAGNOSIS: NORMAL
Lab: NORMAL

## 2022-01-24 ENCOUNTER — PATIENT MESSAGE (OUTPATIENT)
Dept: ORTHOPEDICS | Facility: CLINIC | Age: 47
End: 2022-01-24
Payer: COMMERCIAL

## 2022-01-24 DIAGNOSIS — K13.79 MOUTH SORES: Primary | ICD-10-CM

## 2022-01-25 ENCOUNTER — PATIENT MESSAGE (OUTPATIENT)
Dept: ORTHOPEDICS | Facility: CLINIC | Age: 47
End: 2022-01-25
Payer: COMMERCIAL

## 2022-01-25 ENCOUNTER — TELEPHONE (OUTPATIENT)
Dept: ORTHOPEDICS | Facility: CLINIC | Age: 47
End: 2022-01-25
Payer: COMMERCIAL

## 2022-01-25 NOTE — TELEPHONE ENCOUNTER
1/25/2022 Lvm for pt to call the referrals department to schedule appt with Infectious disease -sm

## 2022-01-26 ENCOUNTER — OFFICE VISIT (OUTPATIENT)
Dept: ORTHOPEDICS | Facility: CLINIC | Age: 47
End: 2022-01-26
Payer: COMMERCIAL

## 2022-01-26 VITALS — WEIGHT: 250 LBS | BODY MASS INDEX: 37.03 KG/M2 | HEIGHT: 69 IN

## 2022-01-26 DIAGNOSIS — Z98.890 POSTOPERATIVE STATE: ICD-10-CM

## 2022-01-26 DIAGNOSIS — M65.9 FLEXOR TENOSYNOVITIS OF FINGER: Primary | ICD-10-CM

## 2022-01-26 PROCEDURE — 99024 POSTOP FOLLOW-UP VISIT: CPT | Mod: S$GLB,,, | Performed by: PHYSICIAN ASSISTANT

## 2022-01-26 PROCEDURE — 99024 PR POST-OP FOLLOW-UP VISIT: ICD-10-PCS | Mod: S$GLB,,, | Performed by: PHYSICIAN ASSISTANT

## 2022-01-26 PROCEDURE — 99999 PR PBB SHADOW E&M-EST. PATIENT-LVL III: CPT | Mod: PBBFAC,,, | Performed by: PHYSICIAN ASSISTANT

## 2022-01-26 PROCEDURE — 3008F BODY MASS INDEX DOCD: CPT | Mod: CPTII,S$GLB,, | Performed by: PHYSICIAN ASSISTANT

## 2022-01-26 PROCEDURE — 99999 PR PBB SHADOW E&M-EST. PATIENT-LVL III: ICD-10-PCS | Mod: PBBFAC,,, | Performed by: PHYSICIAN ASSISTANT

## 2022-01-26 PROCEDURE — 3008F PR BODY MASS INDEX (BMI) DOCUMENTED: ICD-10-PCS | Mod: CPTII,S$GLB,, | Performed by: PHYSICIAN ASSISTANT

## 2022-01-26 PROCEDURE — 1159F MED LIST DOCD IN RCRD: CPT | Mod: CPTII,S$GLB,, | Performed by: PHYSICIAN ASSISTANT

## 2022-01-26 PROCEDURE — 1159F PR MEDICATION LIST DOCUMENTED IN MEDICAL RECORD: ICD-10-PCS | Mod: CPTII,S$GLB,, | Performed by: PHYSICIAN ASSISTANT

## 2022-01-26 NOTE — PROGRESS NOTES
Diaz Serrano  presents for post-operative evaluation.  The patient is now 2 weeks s/p Right index incision and drainage, tenosynovectomy with Dr. Driscoll on 1/14/22.  Overall the patient reports doing well.  He reports minimal discomfort, is not taking any pain medications. He does continue to take the Cefadroxil as prescribed by Infectious Disease. He denies any f/c/s, no discharge/drainage. He has not followed up with his ID Dr. Renner as instructed by [facility]. He called into clinic for mouth sores which he now tells me has been improving since using a mouth wash.    PE:    AA&O x 4.  NAD  HEENT:  NCAT, sclera nonicteric  Lungs:  Respirations are equal and unlabored.  CV:  2+ bilateral upper and lower extremity pulses.  MSK: The wounds are healing well with no signs of erythema or warmth.  There is no drainage.  No clinical signs or symptoms of infection are present.  NTTP throughout the finger, swelling is present. He has good flexion of the finger, full extension present. SILT. DNVI.    A/P: Status post above, doing well  1) Continue with motion as tolerated.  2) All sutures removed today. Wound care and signs of infection discussed.  3) We discuss him following up with Dr. Renner (ID) as instructed by [facility], continue with the Cefadroxil as prescribed.   4) F/U 2 weeks  5) Call with any questions/concerns in the interim      Jamie Russo-DiGeorge PA-C

## 2022-01-30 NOTE — DISCHARGE SUMMARY
[facility]  Hospital Medicine  Discharge Summary      Patient Name: Diaz Serrano Jr.  MRN: [id number]  Patient Class: OP- Observation  Admission Date: 1/14/2022  Hospital Length of Stay: 0 days  Discharge Date and Time: 1/17/22  Attending Physician: No att. providers found   Discharging Provider: Linden Andrea MD  Primary Care Provider: Michelle Del Valle MD  [facility] Medicine Team: [facility]OSP MED A Linden Andrea MD    HPI:   Diaz Serrano Jr. Is a 46 y.o. male with past medical history of HIV, HTN, HLD who presented as transfer from [facility] for Orthopedic hand surgery services. Patient states symptoms started about three days ago, initially with small part of skin breakdown on the distal part of his index finger. Over the next several days he had progressive swelling and redness of the area along with concurrent pain. He was given bactrim but only was able to take one dose before he presented. He was then transferred to [facility] for concern for flexor tenosynovitis. He was admitted to hospital medicine with orthopedic surgery consult.       Procedure(s) (LRB):  INCISION AND DRAINAGE, HAND (Right)  TENDOSYNNOVECTOMY (Right)      Hospital Course:   Patient was admitted to hospital medicine with orthopedic surgery consult. He underwent I&D 1/14/22. He was started on vancomycin and rocephin. Post-operatively patient did well and did not require repeat intervention. Infectious disease consulted. Patient discharged per recommendations Cefadroxil PO 1g twice daily for 10 more days. Return precautions and appropriate follow up given.        Goals of Care Treatment Preferences:  Code Status: Full Code      Consults:   Consults (From admission, onward)        Status Ordering Provider     Inpatient consult to Infectious Diseases  Once        Provider:  (Not yet assigned)    Completed ESME SHAVER     Inpatient consult to Orthopedic Surgery  Once        Provider:  (Not yet  assigned)    Completed MAT MERCADO            Final Active Diagnoses:    Diagnosis Date Noted POA    PRINCIPAL PROBLEM:  Flexor tenosynovitis of finger [M65.9] 01/14/2022 Yes    HIV (human immunodeficiency virus infection) [B20] 01/14/2022 Yes    Essential hypertension [I10] 01/14/2022 Yes    Hyperlipidemia [E78.5] 01/14/2022 Yes      Problems Resolved During this Admission:       Discharged Condition: good    Disposition: Home or Self Care    Follow Up:   Follow-up Information     [facility] 5th Fl.    Specialty: Orthopedics  Contact information:  [address], [address]th Floor  [facility] [address] [phone]  Additional information:  [facility] - Main Building, 5th Floor   [facility] and take [address] elevator           [facility] - [facility]ious Disease 1st Fl.    Specialty: Infectious Diseases  Contact information:  [address]  [facility] [address] [phone]  Additional information:  Main Building, 1st floor near [place] entrance   [facility]                     Patient Instructions:      Ambulatory referral/consult to Orthopedics   Standing Status: Future   Referral Priority: Routine Referral Type: Consultation   Requested Specialty: Orthopedic Surgery   Number of Visits Requested: 1     Ambulatory referral/consult to Allergy   Standing Status: Future   Referral Priority: Routine Referral Type: Allergy Testing   Referral Reason: Specialty Services Required   Requested Specialty: Allergy   Number of Visits Requested: 1     Diet diabetic     Notify your health care provider if you experience any of the following:  temperature >100.4     Notify your health care provider if you experience any of the following:  persistent nausea and vomiting or diarrhea     Notify your health care provider if you experience any of the following:  severe uncontrolled pain     Notify your health care provider if you experience any  of the following:  redness, tenderness, or signs of infection (pain, swelling, redness, odor or green/yellow discharge around incision site)     Notify your health care provider if you experience any of the following:  difficulty breathing or increased cough     Notify your health care provider if you experience any of the following:  severe persistent headache     Notify your health care provider if you experience any of the following:  persistent dizziness, light-headedness, or visual disturbances     Notify your health care provider if you experience any of the following:  increased confusion or weakness     Activity as tolerated       Significant Diagnostic Studies: Labs: All labs within the past 24 hours have been reviewed    Pending Diagnostic Studies:     None         Medications:  Reconciled Home Medications:      Medication List      CONTINUE taking these medications    ALPRAZolam 0.25 MG tablet  Commonly known as: XANAX  Take 0.25 mg by mouth.     atorvastatin 40 MG tablet  Commonly known as: LIPITOR     ibuprofen 800 MG tablet  Commonly known as: ADVIL,MOTRIN  Take 800 mg by mouth 2 (two) times daily as needed for Pain.     lisinopriL-hydrochlorothiazide 10-12.5 mg per tablet  Commonly known as: PRINZIDE,ZESTORETIC  Take 1 tablet by mouth once daily.     * ODEFSEY 200-25-25 mg Tab  Generic drug: cbfifkkigd-sopzvfhj-wtywec ala  Take by mouth.     * ODEFSEY 200-25-25 mg Tab  Generic drug: ueqomczjkj-frhxutqs-lnnbua ala  Take by mouth.     * TRULICITY 0.75 mg/0.5 mL pen injector  Generic drug: dulaglutide  Inject into the skin every 7 days.     * TRULICITY 0.75 mg/0.5 mL pen injector  Generic drug: dulaglutide  Inject 0.75 mg into the skin.         * This list has 4 medication(s) that are the same as other medications prescribed for you. Read the directions carefully, and ask your doctor or other care provider to review them with you.            STOP taking these medications    cefixime 400 mg Cap  Commonly  known as: SUPRAX     cefUROXime 250 MG tablet  Commonly known as: CEFTIN     cyclobenzaprine 10 MG tablet  Commonly known as: FLEXERIL     methylPREDNISolone 4 mg tablet  Commonly known as: MEDROL DOSEPACK        ASK your doctor about these medications    azelastine-fluticasone 137-50 mcg/spray Spry nassal spray  Commonly known as: DYMISTA  1 spray by Nasal route.  Ask about: Should I take this medication?     cefadroxil 1 gram tablet  Commonly known as: DURICEF  Take 1 tablet (1 g total) by mouth 2 (two) times daily. for 10 days  Ask about: Should I take this medication?     oxyCODONE 5 MG immediate release tablet  Commonly known as: ROXICODONE  Take 1 tablet (5 mg total) by mouth every 6 (six) hours as needed for Pain.  Ask about: Should I take this medication?            Indwelling Lines/Drains at time of discharge:   Lines/Drains/Airways     None                 Time spent on the discharge of patient: 35 minutes       Linden Andrea MD  Department of Hospital Medicine  [facility] - Surgery

## 2022-02-01 ENCOUNTER — OFFICE VISIT (OUTPATIENT)
Dept: ORTHOPEDICS | Facility: CLINIC | Age: 47
End: 2022-02-01
Payer: COMMERCIAL

## 2022-02-01 DIAGNOSIS — M65.9 FLEXOR TENOSYNOVITIS OF FINGER: Primary | ICD-10-CM

## 2022-02-01 PROCEDURE — 99999 PR PBB SHADOW E&M-EST. PATIENT-LVL II: ICD-10-PCS | Mod: PBBFAC,,, | Performed by: ORTHOPAEDIC SURGERY

## 2022-02-01 PROCEDURE — 99024 POSTOP FOLLOW-UP VISIT: CPT | Mod: S$GLB,,, | Performed by: ORTHOPAEDIC SURGERY

## 2022-02-01 PROCEDURE — 99024 PR POST-OP FOLLOW-UP VISIT: ICD-10-PCS | Mod: S$GLB,,, | Performed by: ORTHOPAEDIC SURGERY

## 2022-02-01 PROCEDURE — 99999 PR PBB SHADOW E&M-EST. PATIENT-LVL II: CPT | Mod: PBBFAC,,, | Performed by: ORTHOPAEDIC SURGERY

## 2022-02-01 NOTE — PROGRESS NOTES
Diaz Baltazar Zach Bañuelos presents for post-operative evaluation.  The patient is now 2.5 weeks s/p Right index incision and drainage, tenosynovectomy with Dr. Driscoll on 1/14/22.  The patient comes in earlier than scheduled today over some concerns for persistent swelling.  I asked for him to present so I can evaluate his wounds and the finger.  He reports his pain is really minimal and he rates as 0/10 severity.  He has been working on home exercises himself and has regained fair range of motion on his own although some stiffness does persist and he notes he does have some tightness in his joints.  No other complaints and he returns today for re-evaluation.      PE:    AA&O x 4.  NAD  HEENT:  NCAT, sclera nonicteric  Lungs:  Respirations are equal and unlabored.  CV:  2+ bilateral upper and lower extremity pulses.  MSK: The wounds are healing well with no signs of erythema or warmth.  There is no drainage.  No clinical signs or symptoms of infection are present.  NTTP throughout the finger, swelling is present but improved. He has good flexion of the finger and can get the finger tip approximately 2 cm from the palm, full extension present. SILT. DNVI.  There is some scabbing and granulation tissue in the D IP flexion crease of the index finger but his wounds are otherwise healing very well.    A/P: Status post above, doing well  1) Continue with motion as tolerated.  2) continue antibiotics per ID.  Will refer to OT for aggressive range of motion as tolerated without restrictions.  Follow-up as scheduled.  No concerns over infection recurrence at this time.

## 2022-02-10 ENCOUNTER — OFFICE VISIT (OUTPATIENT)
Dept: ORTHOPEDICS | Facility: CLINIC | Age: 47
End: 2022-02-10
Payer: COMMERCIAL

## 2022-02-10 VITALS — WEIGHT: 237.19 LBS | BODY MASS INDEX: 35.13 KG/M2 | HEIGHT: 69 IN

## 2022-02-10 DIAGNOSIS — Z98.890 POSTOPERATIVE STATE: ICD-10-CM

## 2022-02-10 DIAGNOSIS — M65.9 FLEXOR TENOSYNOVITIS OF FINGER: Primary | ICD-10-CM

## 2022-02-10 PROCEDURE — 99999 PR PBB SHADOW E&M-EST. PATIENT-LVL III: CPT | Mod: PBBFAC,,, | Performed by: PHYSICIAN ASSISTANT

## 2022-02-10 PROCEDURE — 3008F BODY MASS INDEX DOCD: CPT | Mod: CPTII,S$GLB,, | Performed by: PHYSICIAN ASSISTANT

## 2022-02-10 PROCEDURE — 99024 PR POST-OP FOLLOW-UP VISIT: ICD-10-PCS | Mod: S$GLB,,, | Performed by: PHYSICIAN ASSISTANT

## 2022-02-10 PROCEDURE — 99024 POSTOP FOLLOW-UP VISIT: CPT | Mod: S$GLB,,, | Performed by: PHYSICIAN ASSISTANT

## 2022-02-10 PROCEDURE — 1159F PR MEDICATION LIST DOCUMENTED IN MEDICAL RECORD: ICD-10-PCS | Mod: CPTII,S$GLB,, | Performed by: PHYSICIAN ASSISTANT

## 2022-02-10 PROCEDURE — 1159F MED LIST DOCD IN RCRD: CPT | Mod: CPTII,S$GLB,, | Performed by: PHYSICIAN ASSISTANT

## 2022-02-10 PROCEDURE — 99999 PR PBB SHADOW E&M-EST. PATIENT-LVL III: ICD-10-PCS | Mod: PBBFAC,,, | Performed by: PHYSICIAN ASSISTANT

## 2022-02-10 PROCEDURE — 3008F PR BODY MASS INDEX (BMI) DOCUMENTED: ICD-10-PCS | Mod: CPTII,S$GLB,, | Performed by: PHYSICIAN ASSISTANT

## 2022-02-10 NOTE — LETTER
February 10, 2022    Diaz Serrano Jr.  [address] [address]         [facility]  [address] [facility] [address]  Phone: [phone]  Fax: [phone] February 10, 2022     Patient: Diaz Serrano Jr.   YOB: 1975   Date of Visit: 2/10/2022       To Whom It May Concern:    It is my medical opinion that Diaz Serrano may return to work on 2/14/22 without restrictions.    If you have any questions or concerns, please don't hesitate to call.    Sincerely,        Jamie L. Russo-Digeorge, PA-C

## 2022-02-10 NOTE — PROGRESS NOTES
Diaz Devlinchay Bañuelos presents for post-operative evaluation.  The patient is now 4 weeks s/p Right index incision and drainage, tenosynovectomy with Dr. Driscoll on 1/14/22.  Overall the patient reports doing well.  He reports 0/10, is not taking any pain medications. He did follow up with his ID doctor and has finished with the antibiotics. He feels ready to return to work without restrictions.    PE:    AA&O x 4.  NAD  HEENT:  NCAT, sclera nonicteric  Lungs:  Respirations are equal and unlabored.  CV:  2+ bilateral upper and lower extremity pulses.  MSK: The wounds are healing well with no signs of erythema or warmth.  There is no drainage.  No clinical signs or symptoms of infection are present.  He does have healing scab to the volar aspect of the finger, this is dry. NTTP throughout the finger, swelling is present but mild. He has full extension, near full flexion of the finger. SILT. DNVI.    A/P: Status post above, doing well  1) Activity as tolerated. Ice and NSAIDs prn for swelling.  2) Return to work without restrictions.  3) RTC in 2 weeks for final wound check. Call into clinic with any concerns in the interim.       Jamie Russo-DiGeorge PA-C

## 2022-02-15 LAB
FUNGUS SPEC CULT: NORMAL
FUNGUS SPEC CULT: NORMAL

## 2022-03-04 LAB
ACID FAST MOD KINY STN SPEC: NORMAL
ACID FAST MOD KINY STN SPEC: NORMAL
MYCOBACTERIUM SPEC QL CULT: NORMAL
MYCOBACTERIUM SPEC QL CULT: NORMAL

## (undated) DEVICE — BLANKET UPPER BODY 78.7X29.9IN

## (undated) DEVICE — SEE MEDLINE ITEM 157117

## (undated) DEVICE — PAD CAST SPECIALIST STRL 4

## (undated) DEVICE — NDL HYPO REG 25G X 1 1/2

## (undated) DEVICE — SEE L#120831

## (undated) DEVICE — PACK ENDOSCOPY GENERAL

## (undated) DEVICE — COVER OVERHEAD SURG LT BLUE

## (undated) DEVICE — TOWEL OR DISP STRL BLUE 4/PK

## (undated) DEVICE — BANDAGE ELAS SOFTWRAP ST 4X5YD

## (undated) DEVICE — SYR 10CC LUER LOCK

## (undated) DEVICE — DRAPE PLASTIC U 60X72

## (undated) DEVICE — SEE MEDLINE ITEM 157110

## (undated) DEVICE — ADHESIVE DERMABOND MINI HV

## (undated) DEVICE — CORD BIPOLAR 12 FOOT

## (undated) DEVICE — SUT 4-0 ETHILON 18 PS-2

## (undated) DEVICE — SUT MONOCRYL 4-0 PS-2

## (undated) DEVICE — Device

## (undated) DEVICE — PADDING CAST 4IN SPECIALIST

## (undated) DEVICE — SEE MEDLINE ITEM 146417

## (undated) DEVICE — GLOVE SURGICAL LATEX SZ 7

## (undated) DEVICE — TOURNIQUET SB QC DP 18X4IN

## (undated) DEVICE — ELECTRODE REM PLYHSV RETURN 9

## (undated) DEVICE — CLIPPER BLADE MOD 4406 (CAREF)

## (undated) DEVICE — DRAPE INCISE IOBAN 2 23X17IN

## (undated) DEVICE — GAUZE SPONGE 4X4 12PLY

## (undated) DEVICE — BANDAGE ELASTIC ACE 2IN 10/CA

## (undated) DEVICE — APPLICATOR CHLORAPREP ORN 26ML

## (undated) DEVICE — NDL 22GA X1 1/2 REG BEVEL

## (undated) DEVICE — UNDERGLOVES BIOGEL PI SIZE 7.5

## (undated) DEVICE — STOCKINET 4INX48

## (undated) DEVICE — SUT VICRYL 3-0 27 SH

## (undated) DEVICE — DRESSING XEROFORM FOIL PK 1X8

## (undated) DEVICE — DRAPE STERI-DRAPE 1000 17X11IN

## (undated) DEVICE — DRAIN PENROSE XRAY 18 X 1/4 ST